# Patient Record
Sex: FEMALE | Race: WHITE | Employment: PART TIME | URBAN - METROPOLITAN AREA
[De-identification: names, ages, dates, MRNs, and addresses within clinical notes are randomized per-mention and may not be internally consistent; named-entity substitution may affect disease eponyms.]

---

## 2017-03-02 ENCOUNTER — HOSPITAL ENCOUNTER (OUTPATIENT)
Dept: CT IMAGING | Age: 42
Discharge: HOME | End: 2017-03-02
Attending: FAMILY MEDICINE
Payer: SELF-PAY

## 2017-03-02 DIAGNOSIS — R07.89 OTHER CHEST PAIN: ICD-10-CM

## 2017-03-02 PROCEDURE — 75571 CT HRT W/O DYE W/CA TEST: CPT

## 2017-03-02 NOTE — CARDIO/PULMONARY
Cardiac Rehab: Spoke with patient about Calcium Scoring results (0). Discussed significance of results, and CAD risk factors. Pt reported that she was being screened because of intermittent chest pain and family hx. Reported she has had chest discomfort \"for years\" and her PCP has not been able to find a cause. Pt was unsure if she had HLD and did not remember having cholesterol checked. Denied HTN, DM and smoking. Discussed heart healthy/low sodium diet and exercise. Has 4 children and does not exercise regularly. Explained cardiac stress test is generally recommended for pts with intermittent chest discomfort. Pt indicated she would seek out cardiologist in [place].  Copy of report sent to patient, with handouts on chest pain and stress test.

## 2022-10-21 ENCOUNTER — HOSPITAL ENCOUNTER (OUTPATIENT)
Dept: PREADMISSION TESTING | Age: 47
Discharge: HOME | End: 2022-10-21

## 2022-10-21 VITALS
HEIGHT: 66 IN | BODY MASS INDEX: 31.96 KG/M2 | OXYGEN SATURATION: 98 % | WEIGHT: 198.85 LBS | TEMPERATURE: 97.3 F | RESPIRATION RATE: 16 BRPM | HEART RATE: 88 BPM

## 2022-10-21 RX ORDER — SUMATRIPTAN 25 MG/1
25 TABLET, FILM COATED ORAL
COMMUNITY

## 2022-10-21 RX ORDER — FREMANEZUMAB-VFRM 225 MG/1.5ML
225 INJECTION SUBCUTANEOUS
COMMUNITY

## 2022-10-21 RX ORDER — DIPHENHYDRAMINE HCL 25 MG
25 TABLET ORAL AS NEEDED
COMMUNITY

## 2022-10-21 RX ORDER — AZELASTINE HCL 205.5 UG/1
1 SPRAY NASAL AS NEEDED
COMMUNITY

## 2022-10-21 RX ORDER — LEVOCETIRIZINE DIHYDROCHLORIDE 5 MG/1
5 TABLET, FILM COATED ORAL
COMMUNITY

## 2022-10-21 RX ORDER — METOCLOPRAMIDE 10 MG/1
10 TABLET ORAL AS NEEDED
COMMUNITY

## 2022-10-21 RX ORDER — SUMATRIPTAN 6 MG/.5ML
INJECTION, SOLUTION SUBCUTANEOUS AS NEEDED
COMMUNITY

## 2022-10-21 NOTE — PERIOP NOTES
[address] [address] [facility]   MAIN OR                                  [phone]   MAIN PRE OP                          [phone]                                                                                AMBULATORY PRE OP          [phone]  PRE-ADMISSION TESTING    [phone]   Surgery Date:  10/28/22       Is surgery arrival time given by surgeon? YES  NO  If NO, [facility] staff will call you between 3 and 7pm the day before your surgery with your arrival time. (If your surgery is on a Monday, we will call you the Friday before.)    Call [phone] after 7pm Monday-Friday if you did not receive this call. INSTRUCTIONS BEFORE YOUR SURGERY   When You  Arrive Arrive at the 2nd [facility] on the day of your surgery  Have your insurance card, photo ID, and any copayment (if needed)   Food   and   Drink NO food or drink after midnight the night before surgery    This means NO water, gum, mints, coffee, juice, etc.  No alcohol (beer, wine, liquor) 24 hours before and after surgery   Medications to   TAKE   Morning of Surgery MEDICATIONS TO TAKE THE MORNING OF SURGERY WITH A SIP OF WATER:      Medications  To  STOP      7 days before surgery Non-Steroidal anti-inflammatory Drugs (NSAID's): for example, Ibuprofen (Advil, Motrin), Naproxen (Aleve)  Aspirin, if taking for pain   Herbal supplements, vitamins, and fish oil  Other:  (Pain medications not listed above, including Tylenol may be taken)   Blood  Thinners If you take  Aspirin, Plavix, Coumadin, or any blood-thinning or anti-blood clot medicine, talk to the doctor who prescribed the medications for pre-operative instructions.    Bathing Clothing  Jewelry  Valuables     If you shower the morning of surgery, please do not apply anything to your skin (lotions, powders, deodorant, or makeup, especially mascara)  Follow Chlorhexidine Care Fusion body wash instructions provided to you during PAT appointment. Begin 3 days prior to surgery. Do not shave or trim anywhere 24 hours before surgery  Wear your hair loose or down; no pony-tails, buns, or metal hair clips  Wear loose, comfortable, clean clothes  Wear glasses instead of contacts  Leave money, valuables, and jewelry, including body piercings, at home  If you were given an [employer], bring it on day of surgery. Going Home - or Spending the Night SAME-DAY SURGERY: You must have a responsible adult drive you home and stay with you 24 hours after surgery  ADMITS: If your doctor is keeping you in the hospital after surgery, leave personal belongings/luggage in your car until you have a hospital room number. Hospital discharge time is 12 noon  Drivers must be here before 12 noon unless you are told differently   Special Instructions Please bring covid vaccine card day of surgery       Follow all instructions so your surgery wont be cancelled. Please, be on time. If a situation occurs and you are delayed the day of surgery, call [phone]. If your physical condition changes (like a fever, cold, flu, etc.) call your surgeon. Home medication(s) reviewed and verified via      LIST   VERBAL   during PAT appointment. The patient was contacted by    IN-PERSON  The patient verbalizes understanding of all instructions and     DOES NOT   need reinforcement.

## 2022-10-28 ENCOUNTER — ANESTHESIA (OUTPATIENT)
Dept: SURGERY | Age: 47
End: 2022-10-28
Payer: COMMERCIAL

## 2022-10-28 ENCOUNTER — HOSPITAL ENCOUNTER (OUTPATIENT)
Age: 47
Setting detail: OUTPATIENT SURGERY
Discharge: HOME | End: 2022-10-28
Attending: ORTHOPAEDIC SURGERY | Admitting: ORTHOPAEDIC SURGERY
Payer: COMMERCIAL

## 2022-10-28 ENCOUNTER — ANESTHESIA EVENT (OUTPATIENT)
Dept: SURGERY | Age: 47
End: 2022-10-28
Payer: COMMERCIAL

## 2022-10-28 VITALS
SYSTOLIC BLOOD PRESSURE: 106 MMHG | BODY MASS INDEX: 31.5 KG/M2 | RESPIRATION RATE: 27 BRPM | DIASTOLIC BLOOD PRESSURE: 53 MMHG | HEIGHT: 66 IN | WEIGHT: 195.99 LBS | HEART RATE: 84 BPM | OXYGEN SATURATION: 100 % | TEMPERATURE: 98.7 F

## 2022-10-28 LAB — HCG UR QL: NEGATIVE

## 2022-10-28 PROCEDURE — 77030026438 HC STYL ET INTUB CARD -A: Performed by: ANESTHESIOLOGY

## 2022-10-28 PROCEDURE — 74011250636 HC RX REV CODE- 250/636: Performed by: ORTHOPAEDIC SURGERY

## 2022-10-28 PROCEDURE — 77030018834: Performed by: ORTHOPAEDIC SURGERY

## 2022-10-28 PROCEDURE — 74011000250 HC RX REV CODE- 250: Performed by: ORTHOPAEDIC SURGERY

## 2022-10-28 PROCEDURE — 77030040922 HC BLNKT HYPOTHRM STRY -A

## 2022-10-28 PROCEDURE — 76060000061 HC AMB SURG ANES 0.5 TO 1 HR: Performed by: ORTHOPAEDIC SURGERY

## 2022-10-28 PROCEDURE — 76210000046 HC AMBSU PH II REC FIRST 0.5 HR: Performed by: ORTHOPAEDIC SURGERY

## 2022-10-28 PROCEDURE — 76210000040 HC AMBSU PH I REC FIRST 0.5 HR: Performed by: ORTHOPAEDIC SURGERY

## 2022-10-28 PROCEDURE — 77030008495 HC TBNG ARTHSC IRR CNMD -B: Performed by: ORTHOPAEDIC SURGERY

## 2022-10-28 PROCEDURE — 77030006884 HC BLD SHV INCIS S&N -B: Performed by: ORTHOPAEDIC SURGERY

## 2022-10-28 PROCEDURE — 77030000032 HC CUF TRNQT ZIMM -B: Performed by: ORTHOPAEDIC SURGERY

## 2022-10-28 PROCEDURE — 74011250636 HC RX REV CODE- 250/636: Performed by: ANESTHESIOLOGY

## 2022-10-28 PROCEDURE — 2709999900 HC NON-CHARGEABLE SUPPLY: Performed by: ORTHOPAEDIC SURGERY

## 2022-10-28 PROCEDURE — 74011250637 HC RX REV CODE- 250/637: Performed by: ANESTHESIOLOGY

## 2022-10-28 PROCEDURE — 77030008684 HC TU ET CUF COVD -B: Performed by: ANESTHESIOLOGY

## 2022-10-28 PROCEDURE — 77030040361 HC SLV COMPR DVT MDII -B

## 2022-10-28 PROCEDURE — 74011000250 HC RX REV CODE- 250: Performed by: ANESTHESIOLOGY

## 2022-10-28 PROCEDURE — 81025 URINE PREGNANCY TEST: CPT

## 2022-10-28 PROCEDURE — 76030000000 HC AMB SURG OR TIME 0.5 TO 1: Performed by: ORTHOPAEDIC SURGERY

## 2022-10-28 RX ORDER — SCOLOPAMINE TRANSDERMAL SYSTEM 1 MG/1
1 PATCH, EXTENDED RELEASE TRANSDERMAL
Status: DISCONTINUED | OUTPATIENT
Start: 2022-10-28 | End: 2022-10-28 | Stop reason: HOSPADM

## 2022-10-28 RX ORDER — FENTANYL CITRATE 50 UG/ML
INJECTION, SOLUTION INTRAMUSCULAR; INTRAVENOUS AS NEEDED
Status: DISCONTINUED | OUTPATIENT
Start: 2022-10-28 | End: 2022-10-28 | Stop reason: HOSPADM

## 2022-10-28 RX ORDER — SODIUM CHLORIDE, SODIUM LACTATE, POTASSIUM CHLORIDE, CALCIUM CHLORIDE 600; 310; 30; 20 MG/100ML; MG/100ML; MG/100ML; MG/100ML
125 INJECTION, SOLUTION INTRAVENOUS CONTINUOUS
Status: DISCONTINUED | OUTPATIENT
Start: 2022-10-28 | End: 2022-10-28 | Stop reason: HOSPADM

## 2022-10-28 RX ORDER — PROPOFOL 10 MG/ML
INJECTION, EMULSION INTRAVENOUS AS NEEDED
Status: DISCONTINUED | OUTPATIENT
Start: 2022-10-28 | End: 2022-10-28 | Stop reason: HOSPADM

## 2022-10-28 RX ORDER — NEOSTIGMINE METHYLSULFATE 1 MG/ML
INJECTION, SOLUTION INTRAVENOUS AS NEEDED
Status: DISCONTINUED | OUTPATIENT
Start: 2022-10-28 | End: 2022-10-28 | Stop reason: HOSPADM

## 2022-10-28 RX ORDER — LIDOCAINE HYDROCHLORIDE 20 MG/ML
INJECTION, SOLUTION EPIDURAL; INFILTRATION; INTRACAUDAL; PERINEURAL AS NEEDED
Status: DISCONTINUED | OUTPATIENT
Start: 2022-10-28 | End: 2022-10-28 | Stop reason: HOSPADM

## 2022-10-28 RX ORDER — ROCURONIUM BROMIDE 10 MG/ML
INJECTION, SOLUTION INTRAVENOUS AS NEEDED
Status: DISCONTINUED | OUTPATIENT
Start: 2022-10-28 | End: 2022-10-28 | Stop reason: HOSPADM

## 2022-10-28 RX ORDER — ONDANSETRON 2 MG/ML
INJECTION INTRAMUSCULAR; INTRAVENOUS AS NEEDED
Status: DISCONTINUED | OUTPATIENT
Start: 2022-10-28 | End: 2022-10-28 | Stop reason: HOSPADM

## 2022-10-28 RX ORDER — PROPOFOL 10 MG/ML
INJECTION, EMULSION INTRAVENOUS
Status: DISCONTINUED | OUTPATIENT
Start: 2022-10-28 | End: 2022-10-28 | Stop reason: HOSPADM

## 2022-10-28 RX ORDER — FLUMAZENIL 0.1 MG/ML
0.2 INJECTION INTRAVENOUS
Status: DISCONTINUED | OUTPATIENT
Start: 2022-10-28 | End: 2022-10-28 | Stop reason: HOSPADM

## 2022-10-28 RX ORDER — BUPIVACAINE HYDROCHLORIDE AND EPINEPHRINE 5; 5 MG/ML; UG/ML
INJECTION, SOLUTION EPIDURAL; INTRACAUDAL; PERINEURAL AS NEEDED
Status: DISCONTINUED | OUTPATIENT
Start: 2022-10-28 | End: 2022-10-28 | Stop reason: HOSPADM

## 2022-10-28 RX ORDER — GLYCOPYRROLATE 0.2 MG/ML
INJECTION INTRAMUSCULAR; INTRAVENOUS AS NEEDED
Status: DISCONTINUED | OUTPATIENT
Start: 2022-10-28 | End: 2022-10-28 | Stop reason: HOSPADM

## 2022-10-28 RX ORDER — DIPHENHYDRAMINE HYDROCHLORIDE 50 MG/ML
12.5 INJECTION, SOLUTION INTRAMUSCULAR; INTRAVENOUS AS NEEDED
Status: DISCONTINUED | OUTPATIENT
Start: 2022-10-28 | End: 2022-10-28 | Stop reason: HOSPADM

## 2022-10-28 RX ORDER — HYDROMORPHONE HYDROCHLORIDE 1 MG/ML
.25-1 INJECTION, SOLUTION INTRAMUSCULAR; INTRAVENOUS; SUBCUTANEOUS
Status: DISCONTINUED | OUTPATIENT
Start: 2022-10-28 | End: 2022-10-28 | Stop reason: HOSPADM

## 2022-10-28 RX ORDER — KETOROLAC TROMETHAMINE 15 MG/ML
INJECTION, SOLUTION INTRAMUSCULAR; INTRAVENOUS AS NEEDED
Status: DISCONTINUED | OUTPATIENT
Start: 2022-10-28 | End: 2022-10-28 | Stop reason: HOSPADM

## 2022-10-28 RX ORDER — LIDOCAINE HYDROCHLORIDE 10 MG/ML
0.1 INJECTION, SOLUTION EPIDURAL; INFILTRATION; INTRACAUDAL; PERINEURAL AS NEEDED
Status: DISCONTINUED | OUTPATIENT
Start: 2022-10-28 | End: 2022-10-28 | Stop reason: HOSPADM

## 2022-10-28 RX ORDER — MIDAZOLAM HYDROCHLORIDE 1 MG/ML
INJECTION, SOLUTION INTRAMUSCULAR; INTRAVENOUS AS NEEDED
Status: DISCONTINUED | OUTPATIENT
Start: 2022-10-28 | End: 2022-10-28 | Stop reason: HOSPADM

## 2022-10-28 RX ORDER — NALOXONE HYDROCHLORIDE 0.4 MG/ML
0.2 INJECTION, SOLUTION INTRAMUSCULAR; INTRAVENOUS; SUBCUTANEOUS
Status: DISCONTINUED | OUTPATIENT
Start: 2022-10-28 | End: 2022-10-28 | Stop reason: HOSPADM

## 2022-10-28 RX ADMIN — PROPOFOL 150 MG: 10 INJECTION, EMULSION INTRAVENOUS at 11:10

## 2022-10-28 RX ADMIN — ONDANSETRON HYDROCHLORIDE 4 MG: 2 SOLUTION INTRAMUSCULAR; INTRAVENOUS at 11:37

## 2022-10-28 RX ADMIN — PROPOFOL 125 MCG/KG/MIN: 10 INJECTION, EMULSION INTRAVENOUS at 11:15

## 2022-10-28 RX ADMIN — LIDOCAINE HYDROCHLORIDE 100 MG: 20 INJECTION, SOLUTION INTRAVENOUS at 11:10

## 2022-10-28 RX ADMIN — MIDAZOLAM HYDROCHLORIDE 2 MG: 1 INJECTION, SOLUTION INTRAMUSCULAR; INTRAVENOUS at 11:07

## 2022-10-28 RX ADMIN — FENTANYL CITRATE 150 MCG: 50 INJECTION, SOLUTION INTRAMUSCULAR; INTRAVENOUS at 11:10

## 2022-10-28 RX ADMIN — NEOSTIGMINE METHYLSULFATE 3 MG: 1 INJECTION, SOLUTION INTRAVENOUS at 11:38

## 2022-10-28 RX ADMIN — PROPOFOL 50 MG: 10 INJECTION, EMULSION INTRAVENOUS at 11:20

## 2022-10-28 RX ADMIN — ROCURONIUM BROMIDE 35 MG: 10 INJECTION INTRAVENOUS at 11:10

## 2022-10-28 RX ADMIN — CEFAZOLIN SODIUM 2 G: 1 POWDER, FOR SOLUTION INTRAMUSCULAR; INTRAVENOUS at 11:18

## 2022-10-28 RX ADMIN — FENTANYL CITRATE 100 MCG: 50 INJECTION, SOLUTION INTRAMUSCULAR; INTRAVENOUS at 11:35

## 2022-10-28 RX ADMIN — KETOROLAC TROMETHAMINE 30 MG: 15 INJECTION, SOLUTION INTRAMUSCULAR; INTRAVENOUS at 11:38

## 2022-10-28 RX ADMIN — SODIUM CHLORIDE, POTASSIUM CHLORIDE, SODIUM LACTATE AND CALCIUM CHLORIDE 125 ML/HR: 600; 310; 30; 20 INJECTION, SOLUTION INTRAVENOUS at 08:48

## 2022-10-28 RX ADMIN — GLYCOPYRROLATE 0.4 MG: 0.2 INJECTION INTRAMUSCULAR; INTRAVENOUS at 11:38

## 2022-10-28 NOTE — OP NOTES
Anival Com[facility]  OPERATIVE REPORT    Name:  Chinmay Fragoso  MR#:  [id number]  :  1975  ACCOUNT #:  [de-identified]  DATE OF SERVICE:  10/28/2022    PREOPERATIVE DIAGNOSIS:  Right knee chondromalacia. POSTOPERATIVE DIAGNOSIS:  Right knee chondromalacia. PROCEDURE PERFORMED:  Right knee arthroscopic chondroplasty of patella, medial and lateral femoral condyles. SURGEON:  nAtonette Vaughn MD.    ASSISTANT:  Stefan Peters    ANESTHESIA:  General.    COMPLICATIONS:  None. SPECIMENS REMOVED:  None. IMPLANTS:  None. ESTIMATED BLOOD LOSS:  Minimal.    DRAINS:  None. PROCEDURE:  The patient was brought to the operating room and given general anesthesia in a supine position on the OR table. A tourniquet and soft roll were applied to the right thigh. The extremity was exsanguinated with an Esmarch and the tourniquet was inflated. The leg was placed into a leg hartman. The contralateral extremity was appropriately positioned, protected, and secured. The right leg was prepped and draped in a sterile fashion with ChloraPrep. A time out was performed. Preoperative prophylactic antibiotic infusion was confirmed. Standard anteromedial and anterolateral portals were created and the knee was examined systematically. Patellofemoral compartment had some diffuse grade III chondromalacia along the lateral facet and median ridge. There were no loose bodies in the suprapatellar pouch, medial or lateral gutters. Medial compartment with some diffuse grade III chondromalacia of the weightbearing surface of the medial femoral condyle with unstable articular cartilage. Lateral compartment had similar findings. The ACL and PCL were intact. The menisci were intact. Chondroplasty performed in all three compartments to remove the unstable articular cartilage. The remaining cartilage was stable to probing. The joint was thoroughly irrigated. The portals closed with nylon.   Knee injected with 30 mL of 0.5% Marcaine. Sterile dressing applied. The patient awakened and sent to recovery in stable condition.       Latisha Denson MD      [id number]  D:  10/28/2022 11:43  T:  10/28/2022 18:04  JOB #:  [id number]

## 2022-10-28 NOTE — BRIEF OP NOTE
Brief Postoperative Note    Patient: Juanito Haines  YOB: 1975  MRN: [id number]    Date of Procedure: 10/28/2022     Pre-Op Diagnosis: Chondromalacia of right patella [M22.41]  Chondromalacia of right knee [M94.261]    Post-Op Diagnosis: Same as preoperative diagnosis.       Procedure(s):  RIGHT KNEE ARTHROSCOPIC CHONDROPLASTY    Surgeon(s):  Ryan Hudson MD    Surgical Assistant: Surg Asst-1: Stefan Peters    Anesthesia: General     Estimated Blood Loss (mL): Minimal    Complications: None    Specimens: * No specimens in log *     Implants: * No implants in log *    Drains: * No LDAs found *    Findings: Chondromalacia     Electronically Signed by Nimo Lombardi MD on 10/28/2022 at 11:39 AM

## 2022-10-28 NOTE — ANESTHESIA PREPROCEDURE EVALUATION
Relevant Problems   No relevant active problems       Anesthetic History     PONV          Review of Systems / Medical History  Patient summary reviewed and pertinent labs reviewed    Pulmonary  Within defined limits                 Neuro/Psych   Within defined limits           Cardiovascular                  Exercise tolerance: >4 METS     GI/Hepatic/Renal  Within defined limits              Endo/Other        Arthritis     Other Findings              Physical Exam    Airway  Mallampati: II  TM Distance: 4 - 6 cm  Neck ROM: normal range of motion   Mouth opening: Normal     Cardiovascular    Rhythm: regular  Rate: normal         Dental  No notable dental hx       Pulmonary  Breath sounds clear to auscultation               Abdominal  GI exam deferred       Other Findings            Anesthetic Plan    ASA: 2  Anesthesia type: general          Induction: Intravenous  Anesthetic plan and risks discussed with: Patient

## 2022-11-14 NOTE — ANESTHESIA POSTPROCEDURE EVALUATION
Procedure(s):  RIGHT KNEE ARTHROSCOPIC CHONDROPLASTY.     general    Anesthesia Post Evaluation      Multimodal analgesia: multimodal analgesia not used between 6 hours prior to anesthesia start to PACU discharge  Patient location during evaluation: PACU  Patient participation: complete - patient participated  Level of consciousness: awake and alert  Pain score: 2  Pain management: satisfactory to patient  Airway patency: patent  Anesthetic complications: no  Cardiovascular status: acceptable  Respiratory status: acceptable  Hydration status: acceptable  Post anesthesia nausea and vomiting:  none      INITIAL Post-op Vital signs:   Vitals Value Taken Time   /86 10/28/22 1215   Temp     Pulse 78 10/28/22 1215   Resp 15 10/28/22 1215   SpO2 95 % 10/28/22 1215

## 2023-05-20 RX ORDER — FREMANEZUMAB-VFRM 225 MG/1.5ML
225 INJECTION SUBCUTANEOUS
COMMUNITY

## 2023-05-20 RX ORDER — AZELASTINE HCL 205.5 UG/1
1 SPRAY NASAL PRN
COMMUNITY

## 2023-05-20 RX ORDER — SUMATRIPTAN 6 MG/.5ML
INJECTION, SOLUTION SUBCUTANEOUS PRN
COMMUNITY

## 2023-05-20 RX ORDER — SUMATRIPTAN 25 MG/1
25 TABLET, FILM COATED ORAL DAILY PRN
COMMUNITY

## 2023-05-20 RX ORDER — LEVOCETIRIZINE DIHYDROCHLORIDE 5 MG/1
5 TABLET, FILM COATED ORAL
COMMUNITY

## 2023-05-20 RX ORDER — METOCLOPRAMIDE 10 MG/1
10 TABLET ORAL PRN
COMMUNITY

## 2023-11-04 ENCOUNTER — HOSPITAL ENCOUNTER (EMERGENCY)
Facility: HOSPITAL | Age: 48
Discharge: HOME | End: 2023-11-04
Attending: EMERGENCY MEDICINE
Payer: COMMERCIAL

## 2023-11-04 ENCOUNTER — APPOINTMENT (OUTPATIENT)
Facility: HOSPITAL | Age: 48
End: 2023-11-04
Payer: COMMERCIAL

## 2023-11-04 VITALS
HEART RATE: 81 BPM | DIASTOLIC BLOOD PRESSURE: 88 MMHG | RESPIRATION RATE: 18 BRPM | BODY MASS INDEX: 30.53 KG/M2 | HEIGHT: 66 IN | SYSTOLIC BLOOD PRESSURE: 143 MMHG | OXYGEN SATURATION: 100 % | WEIGHT: 190 LBS | TEMPERATURE: 98.2 F

## 2023-11-04 DIAGNOSIS — G43.901 STATUS MIGRAINOSUS: Primary | ICD-10-CM

## 2023-11-04 LAB
ALBUMIN SERPL-MCNC: 3.5 G/DL (ref 3.5–5)
ALBUMIN/GLOB SERPL: 0.9 (ref 1.1–2.2)
ALP SERPL-CCNC: 112 U/L (ref 45–117)
ALT SERPL-CCNC: 27 U/L (ref 12–78)
ANION GAP SERPL CALC-SCNC: 7 MMOL/L (ref 5–15)
APPEARANCE UR: CLEAR
AST SERPL W P-5'-P-CCNC: ABNORMAL U/L (ref 15–37)
BACTERIA URNS QL MICRO: ABNORMAL /HPF
BASOPHILS # BLD: 0.1 K/UL (ref 0–0.1)
BASOPHILS NFR BLD: 1 % (ref 0–1)
BILIRUB SERPL-MCNC: 0.5 MG/DL (ref 0.2–1)
BILIRUB UR QL: NEGATIVE
BUN SERPL-MCNC: 11 MG/DL (ref 6–20)
BUN/CREAT SERPL: 15 (ref 12–20)
CA-I BLD-MCNC: 8.6 MG/DL (ref 8.5–10.1)
CHLORIDE SERPL-SCNC: 109 MMOL/L (ref 97–108)
CO2 SERPL-SCNC: 24 MMOL/L (ref 21–32)
COLOR UR: ABNORMAL
CREAT SERPL-MCNC: 0.72 MG/DL (ref 0.55–1.02)
DIFFERENTIAL METHOD BLD: ABNORMAL
EOSINOPHIL # BLD: 0.2 K/UL (ref 0–0.4)
EOSINOPHIL NFR BLD: 2 % (ref 0–7)
EPITH CASTS URNS QL MICRO: ABNORMAL /LPF
ERYTHROCYTE [DISTWIDTH] IN BLOOD BY AUTOMATED COUNT: 13.6 % (ref 11.5–14.5)
GLOBULIN SER CALC-MCNC: 3.9 G/DL (ref 2–4)
GLUCOSE SERPL-MCNC: 90 MG/DL (ref 65–100)
GLUCOSE UR STRIP.AUTO-MCNC: NEGATIVE MG/DL
HCT VFR BLD AUTO: 40.3 % (ref 35–47)
HGB BLD-MCNC: 13.3 G/DL (ref 11.5–16)
HGB UR QL STRIP: NEGATIVE
IMM GRANULOCYTES # BLD AUTO: 0.1 K/UL (ref 0–0.04)
IMM GRANULOCYTES NFR BLD AUTO: 1 % (ref 0–0.5)
KETONES UR QL STRIP.AUTO: NEGATIVE MG/DL
LEUKOCYTE ESTERASE UR QL STRIP.AUTO: ABNORMAL
LYMPHOCYTES # BLD: 2.2 K/UL (ref 0.8–3.5)
LYMPHOCYTES NFR BLD: 20 % (ref 12–49)
MCH RBC QN AUTO: 27.4 PG (ref 26–34)
MCHC RBC AUTO-ENTMCNC: 33 G/DL (ref 30–36.5)
MCV RBC AUTO: 82.9 FL (ref 80–99)
MONOCYTES # BLD: 0.6 K/UL (ref 0–1)
MONOCYTES NFR BLD: 6 % (ref 5–13)
NEUTS SEG # BLD: 7.9 K/UL (ref 1.8–8)
NEUTS SEG NFR BLD: 70 % (ref 32–75)
NITRITE UR QL STRIP.AUTO: NEGATIVE
NRBC # BLD: 0 K/UL (ref 0–0.01)
NRBC BLD-RTO: 0 PER 100 WBC
PH UR STRIP: 7 (ref 5–8)
PLATELET # BLD AUTO: 314 K/UL (ref 150–400)
PMV BLD AUTO: 10.3 FL (ref 8.9–12.9)
POTASSIUM SERPL-SCNC: ABNORMAL MMOL/L (ref 3.5–5.1)
PROT SERPL-MCNC: 7.4 G/DL (ref 6.4–8.2)
PROT UR STRIP-MCNC: NEGATIVE MG/DL
RBC # BLD AUTO: 4.86 M/UL (ref 3.8–5.2)
RBC #/AREA URNS HPF: ABNORMAL /HPF (ref 0–5)
SODIUM SERPL-SCNC: 140 MMOL/L (ref 136–145)
SP GR UR REFRACTOMETRY: 1.01 (ref 1–1.03)
URINE CULTURE IF INDICATED: ABNORMAL
UROBILINOGEN UR QL STRIP.AUTO: 0.1 EU/DL (ref 0.1–1)
WBC # BLD AUTO: 11.1 K/UL (ref 3.6–11)
WBC URNS QL MICRO: ABNORMAL /HPF (ref 0–4)

## 2023-11-04 PROCEDURE — 96366 THER/PROPH/DIAG IV INF ADDON: CPT

## 2023-11-04 PROCEDURE — 80053 COMPREHEN METABOLIC PANEL: CPT

## 2023-11-04 PROCEDURE — 96365 THER/PROPH/DIAG IV INF INIT: CPT

## 2023-11-04 PROCEDURE — 6370000000 HC RX 637 (ALT 250 FOR IP): Performed by: EMERGENCY MEDICINE

## 2023-11-04 PROCEDURE — 2500000003 HC RX 250 WO HCPCS: Performed by: EMERGENCY MEDICINE

## 2023-11-04 PROCEDURE — 96376 TX/PRO/DX INJ SAME DRUG ADON: CPT

## 2023-11-04 PROCEDURE — 36415 COLL VENOUS BLD VENIPUNCTURE: CPT

## 2023-11-04 PROCEDURE — 81001 URINALYSIS AUTO W/SCOPE: CPT

## 2023-11-04 PROCEDURE — 6360000002 HC RX W HCPCS: Performed by: EMERGENCY MEDICINE

## 2023-11-04 PROCEDURE — 70450 CT HEAD/BRAIN W/O DYE: CPT

## 2023-11-04 PROCEDURE — 99284 EMERGENCY DEPT VISIT MOD MDM: CPT

## 2023-11-04 PROCEDURE — 2580000003 HC RX 258: Performed by: EMERGENCY MEDICINE

## 2023-11-04 PROCEDURE — 85025 COMPLETE CBC W/AUTO DIFF WBC: CPT

## 2023-11-04 PROCEDURE — 96375 TX/PRO/DX INJ NEW DRUG ADDON: CPT

## 2023-11-04 RX ORDER — KETOROLAC TROMETHAMINE 15 MG/ML
15 INJECTION, SOLUTION INTRAMUSCULAR; INTRAVENOUS ONCE
Status: COMPLETED | OUTPATIENT
Start: 2023-11-04 | End: 2023-11-04

## 2023-11-04 RX ORDER — 0.9 % SODIUM CHLORIDE 0.9 %
1000 INTRAVENOUS SOLUTION INTRAVENOUS ONCE
Status: COMPLETED | OUTPATIENT
Start: 2023-11-04 | End: 2023-11-04

## 2023-11-04 RX ORDER — DEXAMETHASONE SODIUM PHOSPHATE 10 MG/ML
10 INJECTION, SOLUTION INTRAMUSCULAR; INTRAVENOUS
Status: COMPLETED | OUTPATIENT
Start: 2023-11-04 | End: 2023-11-04

## 2023-11-04 RX ORDER — ONDANSETRON 4 MG/1
4 TABLET, ORALLY DISINTEGRATING ORAL EVERY 8 HOURS PRN
Status: DISCONTINUED | OUTPATIENT
Start: 2023-11-04 | End: 2023-11-04

## 2023-11-04 RX ORDER — ONDANSETRON 2 MG/ML
4 INJECTION INTRAMUSCULAR; INTRAVENOUS EVERY 6 HOURS PRN
Status: DISCONTINUED | OUTPATIENT
Start: 2023-11-04 | End: 2023-11-04 | Stop reason: HOSPADM

## 2023-11-04 RX ORDER — MAGNESIUM SULFATE HEPTAHYDRATE 40 MG/ML
2000 INJECTION, SOLUTION INTRAVENOUS ONCE
Status: COMPLETED | OUTPATIENT
Start: 2023-11-04 | End: 2023-11-04

## 2023-11-04 RX ORDER — DIPHENHYDRAMINE HYDROCHLORIDE 50 MG/ML
25 INJECTION INTRAMUSCULAR; INTRAVENOUS
Status: COMPLETED | OUTPATIENT
Start: 2023-11-04 | End: 2023-11-04

## 2023-11-04 RX ORDER — PROCHLORPERAZINE EDISYLATE 5 MG/ML
10 INJECTION INTRAMUSCULAR; INTRAVENOUS
Status: COMPLETED | OUTPATIENT
Start: 2023-11-04 | End: 2023-11-04

## 2023-11-04 RX ORDER — DIAZEPAM 5 MG/1
5 TABLET ORAL ONCE
Status: COMPLETED | OUTPATIENT
Start: 2023-11-04 | End: 2023-11-04

## 2023-11-04 RX ORDER — ONDANSETRON 4 MG/1
4 TABLET, ORALLY DISINTEGRATING ORAL ONCE
Status: COMPLETED | OUTPATIENT
Start: 2023-11-04 | End: 2023-11-04

## 2023-11-04 RX ORDER — KETOROLAC TROMETHAMINE 30 MG/ML
30 INJECTION, SOLUTION INTRAMUSCULAR; INTRAVENOUS
Status: COMPLETED | OUTPATIENT
Start: 2023-11-04 | End: 2023-11-04

## 2023-11-04 RX ORDER — KETOROLAC TROMETHAMINE 10 MG/1
10 TABLET, FILM COATED ORAL EVERY 6 HOURS PRN
Qty: 12 TABLET | Refills: 0 | Status: SHIPPED | OUTPATIENT
Start: 2023-11-04 | End: 2023-11-07

## 2023-11-04 RX ADMIN — SODIUM CHLORIDE 1000 ML: 9 INJECTION, SOLUTION INTRAVENOUS at 11:17

## 2023-11-04 RX ADMIN — KETOROLAC TROMETHAMINE 30 MG: 30 INJECTION, SOLUTION INTRAMUSCULAR; INTRAVENOUS at 19:43

## 2023-11-04 RX ADMIN — DEXAMETHASONE SODIUM PHOSPHATE 10 MG: 10 INJECTION, SOLUTION INTRAMUSCULAR; INTRAVENOUS at 15:43

## 2023-11-04 RX ADMIN — KETOROLAC TROMETHAMINE 15 MG: 15 INJECTION, SOLUTION INTRAMUSCULAR; INTRAVENOUS at 11:17

## 2023-11-04 RX ADMIN — ONDANSETRON 4 MG: 4 TABLET, ORALLY DISINTEGRATING ORAL at 18:46

## 2023-11-04 RX ADMIN — ONDANSETRON 4 MG: 2 INJECTION INTRAMUSCULAR; INTRAVENOUS at 17:53

## 2023-11-04 RX ADMIN — PROCHLORPERAZINE EDISYLATE 10 MG: 5 INJECTION INTRAMUSCULAR; INTRAVENOUS at 11:17

## 2023-11-04 RX ADMIN — DIAZEPAM 5 MG: 5 TABLET ORAL at 17:50

## 2023-11-04 RX ADMIN — DIPHENHYDRAMINE HYDROCHLORIDE 25 MG: 50 INJECTION INTRAMUSCULAR; INTRAVENOUS at 11:16

## 2023-11-04 RX ADMIN — MAGNESIUM SULFATE HEPTAHYDRATE 2000 MG: 40 INJECTION, SOLUTION INTRAVENOUS at 15:47

## 2023-11-04 RX ADMIN — SODIUM CHLORIDE 1000 ML: 9 INJECTION, SOLUTION INTRAVENOUS at 15:43

## 2023-11-04 ASSESSMENT — PAIN DESCRIPTION - LOCATION: LOCATION: HEAD

## 2023-11-04 ASSESSMENT — LIFESTYLE VARIABLES
HOW OFTEN DO YOU HAVE A DRINK CONTAINING ALCOHOL: MONTHLY OR LESS
HOW MANY STANDARD DRINKS CONTAINING ALCOHOL DO YOU HAVE ON A TYPICAL DAY: 1 OR 2

## 2023-11-04 ASSESSMENT — PAIN - FUNCTIONAL ASSESSMENT: PAIN_FUNCTIONAL_ASSESSMENT: 0-10

## 2023-11-04 ASSESSMENT — PAIN SCALES - GENERAL: PAINLEVEL_OUTOF10: 10

## 2023-11-04 NOTE — DISCHARGE INSTRUCTIONS
Thank you! Thank you for allowing me to care for you in the emergency department. It is my goal to provide you with excellent care. If you have not received excellent quality care, please ask to speak to the nurse manager. Please fill out the survey that will come to you by mail or email since we listen to your feedback! Below you will find a list of your tests from today's visit. Should you have any questions, please do not hesitate to call the emergency department.     Labs  Recent Results (from the past 12 hour(s))   CBC with Auto Differential    Collection Time: 11/04/23 11:18 AM   Result Value Ref Range    WBC 11.1 (H) 3.6 - 11.0 K/uL    RBC 4.86 3.80 - 5.20 M/uL    Hemoglobin 13.3 11.5 - 16.0 g/dL    Hematocrit 40.3 35.0 - 47.0 %    MCV 82.9 80.0 - 99.0 FL    MCH 27.4 26.0 - 34.0 PG    MCHC 33.0 30.0 - 36.5 g/dL    RDW 13.6 11.5 - 14.5 %    Platelets 242 478 - 486 K/uL    MPV 10.3 8.9 - 12.9 FL    Nucleated RBCs 0.0 0.0  WBC    nRBC 0.00 0.00 - 0.01 K/uL    Neutrophils % 70 32 - 75 %    Lymphocytes % 20 12 - 49 %    Monocytes % 6 5 - 13 %    Eosinophils % 2 0 - 7 %    Basophils % 1 0 - 1 %    Immature Granulocytes 1 (H) 0 - 0.5 %    Neutrophils Absolute 7.9 1.8 - 8.0 K/UL    Lymphocytes Absolute 2.2 0.8 - 3.5 K/UL    Monocytes Absolute 0.6 0.0 - 1.0 K/UL    Eosinophils Absolute 0.2 0.0 - 0.4 K/UL    Basophils Absolute 0.1 0.0 - 0.1 K/UL    Absolute Immature Granulocyte 0.1 (H) 0.00 - 0.04 K/UL    Differential Type AUTOMATED     CMP    Collection Time: 11/04/23 11:18 AM   Result Value Ref Range    Sodium 140 136 - 145 mmol/L    Potassium Hemolyzed, Recollection Recommended 3.5 - 5.1 mmol/L    Chloride 109 (H) 97 - 108 mmol/L    CO2 24 21 - 32 mmol/L    Anion Gap 7 5 - 15 mmol/L    Glucose 90 65 - 100 mg/dL    BUN 11 6 - 20 mg/dL    Creatinine 0.72 0.55 - 1.02 mg/dL    Bun/Cre Ratio 15 12 - 20      Est, Glom Filt Rate >60 >60 ml/min/1.73m2    Calcium 8.6 8.5 - 10.1 mg/dL    Total Bilirubin 0.5 care provider, please return to the Emergency Department. We are available 24 hours a day. If a prescription has been provided, please have it filled as soon as possible to prevent a delay in treatment. If you have any questions or reservations about taking the medication due to side effects or interactions with other medications, please call your primary care provider or contact the ER.

## 2023-11-04 NOTE — ED TRIAGE NOTES
Pt arrives with complaints of migraines and nausea that started a week ago. Pt has a hx of migraines and takes sumatripan (imitrex) and states that hasn't been working.

## 2023-11-04 NOTE — ED PROVIDER NOTES
instructions have been discussed, understanding conveyed, and agreed upon. The patient is to follow up as recommended or return to ER should their symptoms worsen. PATIENT REFERRED TO:  Reha King, [address] [facility]  [address] [place] [address]  [phone]    Schedule an appointment as soon as possible for a visit       [facility] EMERGENCY DEPT  [address] [place] [address]  [phone]    As needed        DISCHARGE MEDICATIONS:     Medication List        START taking these medications      ketorolac 10 MG tablet  Commonly known as: TORADOL  Take 1 tablet by mouth every 6 hours as needed for Pain            ASK your doctor about these medications      Ajovy 225 MG/1.5ML Sosy  Generic drug: Fremanezumab-vfrm     azelastine HCl 0.15 % Soln     diphenhydrAMINE 25 MG tablet  Commonly known as: SOMINEX     levocetirizine 5 MG tablet  Commonly known as: XYZAL     MAGNESIUM PO     metoclopramide 10 MG tablet  Commonly known as: REGLAN     POTASSIUM PO     * SUMAtriptan 25 MG tablet  Commonly known as: IMITREX     * SUMAtriptan Succinate 6 MG/0.5ML Sosy injection           * This list has 2 medication(s) that are the same as other medications prescribed for you. Read the directions carefully, and ask your doctor or other care provider to review them with you. Where to Get Your Medications        These medications were sent to [facility], [address] [place] [address]  [address] [place] [address], [address] [place] [address]      Phone: [phone]   ketorolac 10 MG tablet           DISCONTINUED MEDICATIONS:  Discharge Medication List as of 11/4/2023  8:50 PM          I am the Primary Clinician of Record. Gavino Ware MD (electronically signed)    (Please note that parts of this dictation were completed with voice recognition software.  Quite often unanticipated grammatical, syntax, homophones, and other interpretive errors are inadvertently transcribed by the computer software. Please disregards these errors.  Please excuse any errors that have escaped final proofreading.)        Severino Pearl MD  11/05/23 8969

## 2023-12-05 ENCOUNTER — OFFICE VISIT (OUTPATIENT)
Age: 48
End: 2023-12-05
Payer: COMMERCIAL

## 2023-12-05 VITALS
OXYGEN SATURATION: 97 % | BODY MASS INDEX: 31.18 KG/M2 | DIASTOLIC BLOOD PRESSURE: 84 MMHG | HEART RATE: 87 BPM | RESPIRATION RATE: 22 BRPM | HEIGHT: 66 IN | WEIGHT: 194 LBS | SYSTOLIC BLOOD PRESSURE: 132 MMHG | TEMPERATURE: 98.5 F

## 2023-12-05 DIAGNOSIS — M25.561 CHRONIC PAIN OF BOTH KNEES: ICD-10-CM

## 2023-12-05 DIAGNOSIS — M25.562 CHRONIC PAIN OF BOTH KNEES: ICD-10-CM

## 2023-12-05 DIAGNOSIS — Z76.89 ENCOUNTER TO ESTABLISH CARE WITH NEW DOCTOR: Primary | ICD-10-CM

## 2023-12-05 DIAGNOSIS — G89.29 CHRONIC PAIN OF BOTH KNEES: ICD-10-CM

## 2023-12-05 DIAGNOSIS — J32.9 FREQUENT SINUS INFECTIONS: ICD-10-CM

## 2023-12-05 PROCEDURE — 99203 OFFICE O/P NEW LOW 30 MIN: CPT

## 2023-12-05 SDOH — ECONOMIC STABILITY: FOOD INSECURITY: WITHIN THE PAST 12 MONTHS, THE FOOD YOU BOUGHT JUST DIDN'T LAST AND YOU DIDN'T HAVE MONEY TO GET MORE.: PATIENT DECLINED

## 2023-12-05 SDOH — ECONOMIC STABILITY: FOOD INSECURITY: WITHIN THE PAST 12 MONTHS, YOU WORRIED THAT YOUR FOOD WOULD RUN OUT BEFORE YOU GOT MONEY TO BUY MORE.: PATIENT DECLINED

## 2023-12-05 SDOH — ECONOMIC STABILITY: INCOME INSECURITY: HOW HARD IS IT FOR YOU TO PAY FOR THE VERY BASICS LIKE FOOD, HOUSING, MEDICAL CARE, AND HEATING?: PATIENT DECLINED

## 2023-12-05 SDOH — ECONOMIC STABILITY: HOUSING INSECURITY
IN THE LAST 12 MONTHS, WAS THERE A TIME WHEN YOU DID NOT HAVE A STEADY PLACE TO SLEEP OR SLEPT IN A SHELTER (INCLUDING NOW)?: PATIENT REFUSED

## 2023-12-05 ASSESSMENT — PATIENT HEALTH QUESTIONNAIRE - PHQ9
1. LITTLE INTEREST OR PLEASURE IN DOING THINGS: 1
SUM OF ALL RESPONSES TO PHQ QUESTIONS 1-9: 2
SUM OF ALL RESPONSES TO PHQ QUESTIONS 1-9: 2
SUM OF ALL RESPONSES TO PHQ9 QUESTIONS 1 & 2: 2
SUM OF ALL RESPONSES TO PHQ QUESTIONS 1-9: 2
2. FEELING DOWN, DEPRESSED OR HOPELESS: 1
SUM OF ALL RESPONSES TO PHQ QUESTIONS 1-9: 2

## 2023-12-05 NOTE — PATIENT INSTRUCTIONS
- please fax your most recent prior lab results as able for us to review  - follow up in x6 months for annual physical and lab work as indicated  - please reach out with any questions or concerns in the interim    Rheumatology Referral Options    [facility], [address], [address] [facility] [address]   [address] [place] [address], [address] [facility]  [phone]  *Website states accepts [facility]  [address], [address]  [phone]  *Accepts MARISA YEUNG [facility]   [address], [address] [facility], [address]   [phone]  *Does NOT accept Medicaid, Dual Programs    [facility]  [address], [address]  Phone: [phone]  *Accepts Medicaid

## 2024-02-14 ENCOUNTER — TELEMEDICINE (OUTPATIENT)
Age: 49
End: 2024-02-14
Payer: COMMERCIAL

## 2024-02-14 DIAGNOSIS — J32.9 FREQUENT SINUS INFECTIONS: Primary | ICD-10-CM

## 2024-02-14 PROBLEM — G43.909 MIGRAINES: Status: ACTIVE | Noted: 2024-02-14

## 2024-02-14 PROCEDURE — 99213 OFFICE O/P EST LOW 20 MIN: CPT | Performed by: FAMILY MEDICINE

## 2024-02-14 RX ORDER — LEVOFLOXACIN 500 MG/1
500 TABLET, FILM COATED ORAL DAILY
Qty: 10 TABLET | Refills: 0 | Status: SHIPPED | OUTPATIENT
Start: 2024-02-14 | End: 2024-02-24

## 2024-02-14 NOTE — PROGRESS NOTES
Hilda Oh  48 y.o. female  1975  [address] [facility] [address]  [id number]   [facility]:    Telemedicine Progress Note  Aayush Baptiste MD       Encounter Date and Time: February 14, 2024 at 9:34 AM    Hilda Oh, was evaluated through a synchronous (real-time) audio-video encounter. The patient (or guardian if applicable) is aware that this is a billable service, which includes applicable co-pays. This Virtual Visit was conducted with patient's (and/or legal guardian's) consent. Patient identification was verified, and a caregiver was present when appropriate.   The patient was located at Home: [address] [facility] [address]  Provider was located at Home [place]Appt Dept [place]      Chief Complaint   Patient presents with    Sinusitis     History of Present Illness   Hilda Oh is a 48 y.o. female was evaluated by synchronous (real-time) audio-video technology from home, through a secure patient portal.    Sinus congestion for 1 month.  Worse over the past couple days. Has associated sore throat and fatigue. Pain over right maxillary sinus.  No fevers, chills. Has tried OTC medications (allegra and cold medication).  Has seen ENT for this in the past.  Previously improved with Levaquin.        Review of Systems   Review of Systems   Constitutional:  Negative for chills and fever.   HENT:  Positive for sinus pressure and sinus pain.          Vitals/Objective:     General: alert, cooperative, and no distress   Mental  status: mental status: alert, oriented to person, place, and time, normal mood, behavior, speech, dress, motor activity, and thought processes   Resp: No respiratory distress   Neuro: No focal deficits   Skin: skin: no discoloration or lesions of concern on visible areas   Due to this being a TeleHealth evaluation, many elements of the physical examination are unable to be assessed.      Assessment and Plan:   1. Frequent sinus

## 2024-03-01 RX ORDER — MELOXICAM 15 MG/1
15 TABLET ORAL DAILY
COMMUNITY

## 2024-03-01 RX ORDER — RIMEGEPANT SULFATE 75 MG/75MG
TABLET, ORALLY DISINTEGRATING ORAL
COMMUNITY
Start: 2024-02-13 | End: 2024-03-04 | Stop reason: SDUPTHER

## 2024-03-04 ENCOUNTER — OFFICE VISIT (OUTPATIENT)
Age: 49
End: 2024-03-04
Payer: COMMERCIAL

## 2024-03-04 VITALS
HEART RATE: 71 BPM | OXYGEN SATURATION: 99 % | RESPIRATION RATE: 18 BRPM | WEIGHT: 195 LBS | HEIGHT: 66 IN | DIASTOLIC BLOOD PRESSURE: 84 MMHG | BODY MASS INDEX: 31.34 KG/M2 | TEMPERATURE: 97.1 F | SYSTOLIC BLOOD PRESSURE: 138 MMHG

## 2024-03-04 DIAGNOSIS — G43.709 CHRONIC MIGRAINE W/O AURA, NOT INTRACTABLE, W/O STAT MIGR: ICD-10-CM

## 2024-03-04 DIAGNOSIS — M54.81 BILATERAL OCCIPITAL NEURALGIA: Primary | ICD-10-CM

## 2024-03-04 DIAGNOSIS — G44.86 CERVICOGENIC HEADACHE: ICD-10-CM

## 2024-03-04 PROCEDURE — 99205 OFFICE O/P NEW HI 60 MIN: CPT | Performed by: PSYCHIATRY & NEUROLOGY

## 2024-03-04 RX ORDER — ELETRIPTAN HYDROBROMIDE 40 MG/1
TABLET, FILM COATED ORAL
Qty: 12 TABLET | Refills: 3 | Status: SHIPPED | OUTPATIENT
Start: 2024-03-04

## 2024-03-04 RX ORDER — KETOROLAC TROMETHAMINE 10 MG/1
10 TABLET, FILM COATED ORAL EVERY 6 HOURS PRN
Qty: 20 TABLET | Refills: 0 | Status: SHIPPED | OUTPATIENT
Start: 2024-03-04 | End: 2025-03-04

## 2024-03-04 RX ORDER — RIMEGEPANT SULFATE 75 MG/75MG
75 TABLET, ORALLY DISINTEGRATING ORAL EVERY OTHER DAY
Qty: 16 TABLET | Refills: 5 | Status: SHIPPED | OUTPATIENT
Start: 2024-03-04

## 2024-03-04 RX ORDER — ZONISAMIDE 100 MG/1
CAPSULE ORAL
COMMUNITY
Start: 2023-12-26 | End: 2024-03-04

## 2024-03-04 ASSESSMENT — PATIENT HEALTH QUESTIONNAIRE - PHQ9
SUM OF ALL RESPONSES TO PHQ QUESTIONS 1-9: 0
SUM OF ALL RESPONSES TO PHQ QUESTIONS 1-9: 0
1. LITTLE INTEREST OR PLEASURE IN DOING THINGS: 0
SUM OF ALL RESPONSES TO PHQ QUESTIONS 1-9: 0
SUM OF ALL RESPONSES TO PHQ9 QUESTIONS 1 & 2: 0
SUM OF ALL RESPONSES TO PHQ QUESTIONS 1-9: 0
2. FEELING DOWN, DEPRESSED OR HOPELESS: 0

## 2024-03-04 NOTE — PROGRESS NOTES
Chief Complaint   Patient presents with    New Patient     Patient reports she has had migraines as a senior in highschool and went to hospital and had a spinal tap  and was told it was negative.  Patient reports when she takes Nurtec every other day she has had 2 migraines in the last 30 days. Patient starts she gets pain in upper back and radiates and radiates up to back of neck and head. Patient currently seeing a neurologist in [place], Dr. Sobeida Murrell.       
  Lymphatics:  No lymphadenopathy in the neck/head.   Neck and Thyroid:   No bruits noted in the neck.   Respiratory:  Lungs clear to auscultation.   Cardiovascular:  Palpation and auscultation: regular rate and rhythm.   Extremity: No joint swelling, erythema or pedal edema.      NEUROLOGICAL EXAM:    Appearance:  The patient is obese, provides a coherent history and is in no acute distress.   Mental Status: Oriented to time, place and person. Fluent, no aphasia or dysarthria. Mood and affect appropriate.   Cranial Nerves:   Intact visual fields. VA 20/20 OU on near vision with correction. Fundi are benign. BYRON, EOM's full, no nystagmus, no ptosis. Facial sensation is normal. Corneal reflexes are intact. Facial movement is symmetric. Hearing is normal bilaterally. Palate is midline with normal elevation. Sternocleidomastoid and trapezius muscles are normal. Tongue is midline.   Motor:  5/5 strength in upper and lower proximal and distal muscles. Normal bulk and tone. No fasciculations. No pronator drift.    Reflexes:   Deep tendon reflexes 2+/4 and symmetrical. Downgoing toes.   Sensory:   Normal to cold and vibration.   Gait:  Normal gait. No Romberg. Can do tandem walking.   Tremor:   No tremor noted.   Cerebellar:  Intact FTN/BRUNILDA/HTS.   Neurovascular:  Normal heart sounds and regular rhythm, peripheral pulses intact, and no carotid bruits.     Anterior head posture with shoulders rotated in  (+) tenderness R>L occiput      Imaging  CT Head: reviewed    Labs Reviewed      Assessment:      Diagnosis Orders   1. Bilateral occipital neuralgia  ketorolac (TORADOL) 10 MG tablet      2. Cervicogenic headache  ketorolac (TORADOL) 10 MG tablet      3. Chronic migraine w/o aura, not intractable, w/o stat migr  ketorolac (TORADOL) 10 MG tablet    NURTEC 75 MG TBDP    eletriptan (RELPAX) 40 MG tablet           Plan:   Neurological examination is nonfocal.  Head CT without contrast essentially normal study.  No indication

## 2024-03-04 NOTE — PATIENT INSTRUCTIONS
your arms at your sides.  Keep your shoulders relaxed and down, not shrugged.  Squeeze your shoulder blades together. Hold for 6 seconds, then relax.  Repeat 8 to 12 times.  Chest-level pull (arms straight)    Sit or stand up straight. Grasp an exercise band with your hands about shoulder-width apart.  Relax your shoulders. With your palms facing down, hold your arms straight out in front of you.  Slowly pull straight out to the sides, squeezing your shoulder blades together. Keep your arms straight and at chest level. Do not pull your shoulders up toward your ears. Hold for 1 to 2 seconds.  Slowly return to your starting position.  Repeat 8 to 12 times.  Rowing    [place] your elastic tubing or band at about waist height. Take one end in each hand.  Sit or stand with your feet hip-width apart.  Hold your arms straight in front of you. Adjust your distance to create slight tension in the tubing or band.  Slightly tuck your chin. Relax your shoulders.  Without shrugging your shoulders, pull straight back. Your elbows will pass alongside your waist.  Pull-downs    [place] your elastic tubing or band in the top of a closed door. Take one end in each hand.  Either sit or stand, depending on what is more comfortable. If you feel unsteady, sit on a chair.  Start with your arms up and comfortably apart, elbows straight. There should be a slight tension in the tubing or band.  Slightly tuck your chin, and look straight ahead.  Keeping your back straight, slowly pull down and back, bending your elbows.  Stop where your hands are level with your chin, in a \"goalpost\" position.  Repeat 8 to 12 times.  Chest T stretch    Lie on your back with your knees bent and your feet about hip-width apart.  Tuck your chin, and relax your shoulders.  Reach your arms straight out to the sides. If you don't feel a mild stretch in your shoulders and across your chest, use a foam roll or a tightly rolled towel or blanket under your spine, from

## 2024-04-01 DIAGNOSIS — G43.709 CHRONIC MIGRAINE W/O AURA, NOT INTRACTABLE, W/O STAT MIGR: ICD-10-CM

## 2024-04-01 DIAGNOSIS — G44.86 CERVICOGENIC HEADACHE: ICD-10-CM

## 2024-04-01 DIAGNOSIS — M54.81 BILATERAL OCCIPITAL NEURALGIA: ICD-10-CM

## 2024-04-02 RX ORDER — KETOROLAC TROMETHAMINE 10 MG/1
10 TABLET, FILM COATED ORAL EVERY 6 HOURS PRN
Qty: 20 TABLET | Refills: 0 | Status: SHIPPED | OUTPATIENT
Start: 2024-04-02

## 2024-05-09 DIAGNOSIS — G43.709 CHRONIC MIGRAINE W/O AURA, NOT INTRACTABLE, W/O STAT MIGR: ICD-10-CM

## 2024-05-09 DIAGNOSIS — G44.86 CERVICOGENIC HEADACHE: ICD-10-CM

## 2024-05-09 DIAGNOSIS — M54.81 BILATERAL OCCIPITAL NEURALGIA: ICD-10-CM

## 2024-05-09 RX ORDER — KETOROLAC TROMETHAMINE 10 MG/1
10 TABLET, FILM COATED ORAL EVERY 6 HOURS PRN
Qty: 20 TABLET | Refills: 0 | Status: SHIPPED | OUTPATIENT
Start: 2024-05-09

## 2024-05-13 ENCOUNTER — TELEPHONE (OUTPATIENT)
Age: 49
End: 2024-05-13

## 2024-05-13 NOTE — TELEPHONE ENCOUNTER
Spoke with pharmacist, Amaury, at [facility] pharmacy, and informed that patient is on Nurtec and Eletriptan according to Dr. Hunt's note. Amaury verbalized understanding.

## 2024-05-13 NOTE — TELEPHONE ENCOUNTER
Calling to discuss medication:    Eletriptan    He needs to make sure she's actively using this medication due to taking Nurtec ODT 75

## 2024-06-03 DIAGNOSIS — G43.709 CHRONIC MIGRAINE W/O AURA, NOT INTRACTABLE, W/O STAT MIGR: Primary | ICD-10-CM

## 2024-06-04 ENCOUNTER — OFFICE VISIT (OUTPATIENT)
Age: 49
End: 2024-06-04

## 2024-06-04 VITALS
BODY MASS INDEX: 29.25 KG/M2 | SYSTOLIC BLOOD PRESSURE: 146 MMHG | DIASTOLIC BLOOD PRESSURE: 88 MMHG | OXYGEN SATURATION: 98 % | HEIGHT: 66 IN | HEART RATE: 88 BPM | RESPIRATION RATE: 18 BRPM | TEMPERATURE: 98.2 F | WEIGHT: 182 LBS

## 2024-06-04 DIAGNOSIS — I10 ELEVATED BLOOD PRESSURE READING WITH DIAGNOSIS OF HYPERTENSION: ICD-10-CM

## 2024-06-04 DIAGNOSIS — I10 PRIMARY HYPERTENSION: Primary | ICD-10-CM

## 2024-06-04 DIAGNOSIS — R13.10 DYSPHAGIA, UNSPECIFIED TYPE: ICD-10-CM

## 2024-06-04 DIAGNOSIS — E66.3 OVERWEIGHT (BMI 25.0-29.9): ICD-10-CM

## 2024-06-04 DIAGNOSIS — Z12.31 ENCOUNTER FOR SCREENING MAMMOGRAM FOR MALIGNANT NEOPLASM OF BREAST: ICD-10-CM

## 2024-06-04 RX ORDER — AMLODIPINE BESYLATE 5 MG/1
5 TABLET ORAL DAILY
Qty: 90 TABLET | Refills: 0 | Status: SHIPPED | OUTPATIENT
Start: 2024-06-04

## 2024-06-04 RX ORDER — CELECOXIB 200 MG/1
CAPSULE ORAL
COMMUNITY
Start: 2024-05-26

## 2024-06-04 RX ORDER — HYDROXYCHLOROQUINE SULFATE 200 MG/1
200 TABLET, FILM COATED ORAL
COMMUNITY
Start: 2024-05-31

## 2024-06-04 NOTE — PROGRESS NOTES
[facility]  [address] [place] [address]   Office [phone], Fax [phone]      Chief Complaint:     Chief Complaint   Patient presents with    Hypertension       SUBJECTIVE  Hilda Oh is an 49 y.o. female who presents for blood pressure check.    #Elevated blood pressures  - reports increased stress with ongoing divorce  - reports worsening migraines. She had new floaters today  - she denies chest pain, SOB, or other symptoms at this time  - no prior diagnosis of HTN    Dysphagia  - onset 2-3 months ago  - reporting intermittent difficulty swallowing solids  - denies abdominal pain, belching, nausea, vomiting  - no prior diagnosis of GERD  - has not taken anything for this    #HM  - due for breast cancer screen  - due for repeat lipid panel  - repeat A1c for T2DM screen  - due for colonoscopy  - Follows with OB-GYN. UTD with cervical cancer screening. Negative prior HIV and Hep C screen      Allergies   Allergies   Allergen Reactions    Topiramate Swelling     Mouth lips swelling         Medications   Current Outpatient Medications   Medication Sig    celecoxib (CELEBREX) 200 MG capsule take 1 capsule by mouth twice a day if needed    hydroxychloroquine (PLAQUENIL) 200 MG tablet Take 1 tablet by mouth    amLODIPine (NORVASC) 5 MG tablet Take 1 tablet by mouth daily    ketorolac (TORADOL) 10 MG tablet take 1 tablet by mouth every 6 hours if needed for pain    NURTEC 75 MG TBDP Take 75 mg by mouth every other day 1 tablet ON THE TONGUE AT ONSET OF HEADACHE every other day if needed    eletriptan (RELPAX) 40 MG tablet Take 1 tab at onset of severe headache; may repeat another in 2 hours if headaches persist    levocetirizine (XYZAL) 5 MG tablet Take 1 tablet by mouth as needed    metoclopramide (REGLAN) 10 MG tablet Take 1 tablet by mouth as needed    Fremanezumab-vfrm (AJOVY) 225 MG/1.5ML SOSY Inject 225 mg into the skin every 30 days (Patient not taking:

## 2024-06-04 NOTE — PATIENT INSTRUCTIONS
- check your blood pressure once daily. Goal blood pressure is less than 140/90.  - start taking the prescribed amlodipine once daily.  - follow up in x2-3 weeks  - call the provided number to schedule a mammogram  - call the provided number to establish with the GI specialist. I suspect that you will need an endoscopy as well as a screening colonoscopy

## 2024-06-04 NOTE — PROGRESS NOTES
Pt is here today for b/p check, states she is under a lot of stress causing her level to be higher than usual, she is also having more migraine.    Identified pt with two pt identifiers(name and ). Reviewed record in preparation for visit and have obtained necessary documentation.  Chief Complaint   Patient presents with    Hypertension        Vitals:    24 1435 24 1443   BP: (!) 164/97 (!) 146/88   Site: Right Upper Arm Left Upper Arm   Position: Sitting Sitting   Cuff Size: Medium Adult Medium Adult   Pulse: 88    Resp: 18    Temp: 98.2 °F (36.8 °C)    TempSrc: Oral    SpO2: 98%    Weight: 82.6 kg (182 lb)    Height: 1.676 m (5' 6\")          Coordination of Care Questionnaire:  :     \"Have you been to the ER, urgent care clinic since your last visit?  Hospitalized since your last visit?\"    NO    “Have you seen or consulted any other health care providers outside of [facility] since your last visit?”    NO    Have you had a mammogram?”   NO    No breast cancer screening on file      “Have you had a pap smear?”    NO    No cervical cancer screening on file         “Have you had a colorectal cancer screening such as a colonoscopy/FIT/Cologuard?    NO    No colonoscopy on file  No cologuard on file  No FIT/FOBT on file   No flexible sigmoidoscopy on file         Click Here for Release of Records Request

## 2024-06-05 LAB
ALBUMIN SERPL-MCNC: 4.4 G/DL (ref 3.5–5)
ALBUMIN/GLOB SERPL: 1.4 (ref 1.1–2.2)
ALP SERPL-CCNC: 122 U/L (ref 45–117)
ALT SERPL-CCNC: 20 U/L (ref 12–78)
ANION GAP SERPL CALC-SCNC: 3 MMOL/L (ref 5–15)
AST SERPL-CCNC: 14 U/L (ref 15–37)
BILIRUB SERPL-MCNC: 0.3 MG/DL (ref 0.2–1)
BUN SERPL-MCNC: 17 MG/DL (ref 6–20)
BUN/CREAT SERPL: 19 (ref 12–20)
CALCIUM SERPL-MCNC: 9.6 MG/DL (ref 8.5–10.1)
CHLORIDE SERPL-SCNC: 108 MMOL/L (ref 97–108)
CHOLEST SERPL-MCNC: 200 MG/DL
CO2 SERPL-SCNC: 27 MMOL/L (ref 21–32)
CREAT SERPL-MCNC: 0.88 MG/DL (ref 0.55–1.02)
CREAT UR-MCNC: 57.7 MG/DL
ERYTHROCYTE [DISTWIDTH] IN BLOOD BY AUTOMATED COUNT: 12.6 % (ref 11.5–14.5)
EST. AVERAGE GLUCOSE BLD GHB EST-MCNC: 100 MG/DL
GLOBULIN SER CALC-MCNC: 3.2 G/DL (ref 2–4)
GLUCOSE SERPL-MCNC: 110 MG/DL (ref 65–100)
HBA1C MFR BLD: 5.1 % (ref 4–5.6)
HCT VFR BLD AUTO: 46.2 % (ref 35–47)
HDLC SERPL-MCNC: 57 MG/DL
HDLC SERPL: 3.5 (ref 0–5)
HGB BLD-MCNC: 15.1 G/DL (ref 11.5–16)
LDLC SERPL CALC-MCNC: 123.2 MG/DL (ref 0–100)
MCH RBC QN AUTO: 27.8 PG (ref 26–34)
MCHC RBC AUTO-ENTMCNC: 32.7 G/DL (ref 30–36.5)
MCV RBC AUTO: 85.1 FL (ref 80–99)
MICROALBUMIN UR-MCNC: 0.5 MG/DL
MICROALBUMIN/CREAT UR-RTO: 9 MG/G (ref 0–30)
NRBC # BLD: 0 K/UL (ref 0–0.01)
NRBC BLD-RTO: 0 PER 100 WBC
PLATELET # BLD AUTO: 376 K/UL (ref 150–400)
PMV BLD AUTO: 10.6 FL (ref 8.9–12.9)
POTASSIUM SERPL-SCNC: 5 MMOL/L (ref 3.5–5.1)
PROT SERPL-MCNC: 7.6 G/DL (ref 6.4–8.2)
RBC # BLD AUTO: 5.43 M/UL (ref 3.8–5.2)
SODIUM SERPL-SCNC: 138 MMOL/L (ref 136–145)
TRIGL SERPL-MCNC: 99 MG/DL
VLDLC SERPL CALC-MCNC: 19.8 MG/DL
WBC # BLD AUTO: 18.1 K/UL (ref 3.6–11)

## 2024-06-06 ENCOUNTER — TELEPHONE (OUTPATIENT)
Age: 49
End: 2024-06-06

## 2024-06-06 NOTE — TELEPHONE ENCOUNTER
Spoke with patient regarding lab work.    Mildly elevated LDL. Recommended lifestyle modifications.    CMP, A1c, and microalbumin normal.    CBC with WBCs elevated to 18.1. Mild elevation on previously available lab work, but not quite to this level. Patient denies fever, chills, cough, dysuria, diarrhea, or other acute illness at this time. She does have a h/o RA which can cause elevations in WBCs. Will plan to check CBC with diff and possible smear at next follow up appointment in a few weeks time.    Patient reporting home blood pressure values remain approximately 145/90. Advised patient that if her systolic blood pressure is consistently over 140 or her diastolic blood pressure is persistently over 90, she can double her daily amlodipine dose to 10mg daily.    Aayush Osborn MD

## 2024-06-11 ENCOUNTER — OFFICE VISIT (OUTPATIENT)
Age: 49
End: 2024-06-11
Payer: COMMERCIAL

## 2024-06-11 VITALS
HEIGHT: 66 IN | DIASTOLIC BLOOD PRESSURE: 88 MMHG | SYSTOLIC BLOOD PRESSURE: 112 MMHG | TEMPERATURE: 98.1 F | HEART RATE: 94 BPM | BODY MASS INDEX: 28.45 KG/M2 | WEIGHT: 177 LBS | OXYGEN SATURATION: 98 %

## 2024-06-11 DIAGNOSIS — G44.86 CERVICOGENIC HEADACHE: ICD-10-CM

## 2024-06-11 DIAGNOSIS — G43.709 CHRONIC MIGRAINE W/O AURA, NOT INTRACTABLE, W/O STAT MIGR: ICD-10-CM

## 2024-06-11 DIAGNOSIS — M54.81 BILATERAL OCCIPITAL NEURALGIA: Primary | ICD-10-CM

## 2024-06-11 PROCEDURE — 99214 OFFICE O/P EST MOD 30 MIN: CPT | Performed by: PSYCHIATRY & NEUROLOGY

## 2024-06-11 RX ORDER — CYCLOBENZAPRINE HCL 10 MG
TABLET ORAL
Qty: 60 TABLET | Refills: 3 | Status: SHIPPED | OUTPATIENT
Start: 2024-06-11

## 2024-06-11 RX ORDER — KETOROLAC TROMETHAMINE 10 MG/1
10 TABLET, FILM COATED ORAL EVERY 6 HOURS PRN
Qty: 20 TABLET | Refills: 0 | Status: SHIPPED | OUTPATIENT
Start: 2024-06-11

## 2024-06-11 RX ORDER — ELETRIPTAN HYDROBROMIDE 40 MG/1
TABLET, FILM COATED ORAL
Qty: 12 TABLET | Refills: 5 | Status: SHIPPED | OUTPATIENT
Start: 2024-06-11

## 2024-06-11 NOTE — PROGRESS NOTES
Identified pt with two pt identifiers(name and ). Reviewed record in preparation for visit and have obtained necessary documentation. All patient medications has been reviewed.  Chief Complaint   Patient presents with    Follow-up    Migraine     Pt is still having at least 4- 7 migraines a month.       Vitals:    24 0928   BP: 112/88   Pulse: 94   Temp: 98.1 °F (36.7 °C)   SpO2: 98%                   Coordination of Care Questionnaire:   1) Have you been to an emergency room, urgent care, or hospitalized since your last visit?   no     2. Have seen or consulted any other health care provider since your last visit? no          
notes    Review of Systems:    A comprehensive review of systems was performed:   Constitutional: positive for poor appetite, fatigue  Eyes: positive for vision problems  Ears, nose, mouth, throat, and face: positive for none  Respiratory: positive for shortness of breath  Cardiovascular: positive for high blood pressure  Gastrointestinal: positive for abdominal pain  Genitourinary: positive for none  Integument/breast: positive for none  Hematologic/lymphatic: positive for none  Musculoskeletal: positive for muscle pain, joint pain  Neurological: positive for headaches, memory loss  Behavioral/Psych: positive for anxiety, depression  Endocrine: positive for none  Allergic/Immunologic: positive for none    Objective:     /88 (Site: Left Upper Arm, Position: Sitting, Cuff Size: Large Adult)   Pulse 94   Temp 98.1 °F (36.7 °C) (Temporal)   Ht 1.676 m (5' 6\")   Wt 80.3 kg (177 lb)   SpO2 98%   BMI 28.57 kg/m²     PHYSICAL EXAM:    NEUROLOGICAL EXAM:    Appearance:  The patient is obese, provides a coherent history and is in no acute distress.   Mental Status: Oriented to time, place and person. Fluent, no aphasia or dysarthria. Mood and affect appropriate.   Cranial Nerves:   II - XII were intact.   Motor:  5/5 strength. No pronator drift.    Reflexes:   Deep tendon reflexes were symmetrical.    Sensory:   Normal to cold and vibration.   Gait:  Normal gait. No Romberg.    Tremor:   No tremor noted.   Cerebellar:  Intact FTN/BRUNILDA/HTS.         Anterior head posture with shoulders rotated in    Labs Reviewed      Assessment:      Diagnosis Orders   1. Bilateral occipital neuralgia  cyclobenzaprine (FLEXERIL) 10 MG tablet    External Referral To Physical Therapy    ketorolac (TORADOL) 10 MG tablet      2. Cervicogenic headache  cyclobenzaprine (FLEXERIL) 10 MG tablet    External Referral To Physical Therapy    ketorolac (TORADOL) 10 MG tablet      3. Chronic migraine w/o aura, not intractable, w/o stat migr

## 2024-06-14 ENCOUNTER — HOSPITAL ENCOUNTER (OUTPATIENT)
Facility: HOSPITAL | Age: 49
Discharge: HOME | End: 2024-06-14
Payer: COMMERCIAL

## 2024-06-14 VITALS — HEIGHT: 66 IN | BODY MASS INDEX: 28.45 KG/M2 | WEIGHT: 177 LBS

## 2024-06-14 DIAGNOSIS — Z12.31 ENCOUNTER FOR SCREENING MAMMOGRAM FOR MALIGNANT NEOPLASM OF BREAST: ICD-10-CM

## 2024-06-14 PROCEDURE — 77063 BREAST TOMOSYNTHESIS BI: CPT

## 2024-06-18 DIAGNOSIS — G44.86 CERVICOGENIC HEADACHE: ICD-10-CM

## 2024-06-18 DIAGNOSIS — M54.81 BILATERAL OCCIPITAL NEURALGIA: Primary | ICD-10-CM

## 2024-06-18 RX ORDER — METHOCARBAMOL 750 MG/1
750 TABLET, FILM COATED ORAL
Qty: 30 TABLET | Refills: 3 | Status: SHIPPED | OUTPATIENT
Start: 2024-06-18

## 2024-06-27 ENCOUNTER — TRANSCRIBE ORDERS (OUTPATIENT)
Facility: HOSPITAL | Age: 49
End: 2024-06-27

## 2024-06-27 DIAGNOSIS — M54.16 LUMBAR RADICULITIS: ICD-10-CM

## 2024-06-27 DIAGNOSIS — M54.50 LUMBAR PAIN: Primary | ICD-10-CM

## 2024-07-02 ENCOUNTER — OFFICE VISIT (OUTPATIENT)
Age: 49
End: 2024-07-02
Payer: COMMERCIAL

## 2024-07-02 VITALS
HEART RATE: 92 BPM | DIASTOLIC BLOOD PRESSURE: 64 MMHG | RESPIRATION RATE: 18 BRPM | WEIGHT: 180.8 LBS | BODY MASS INDEX: 29.06 KG/M2 | TEMPERATURE: 98 F | HEIGHT: 66 IN | SYSTOLIC BLOOD PRESSURE: 96 MMHG | OXYGEN SATURATION: 97 %

## 2024-07-02 DIAGNOSIS — D72.829 LEUKOCYTOSIS, UNSPECIFIED TYPE: ICD-10-CM

## 2024-07-02 DIAGNOSIS — I10 PRIMARY HYPERTENSION: Primary | ICD-10-CM

## 2024-07-02 DIAGNOSIS — S99.921A INJURY OF TOE ON RIGHT FOOT, INITIAL ENCOUNTER: ICD-10-CM

## 2024-07-02 DIAGNOSIS — Z12.4 SCREENING FOR CERVICAL CANCER: ICD-10-CM

## 2024-07-02 DIAGNOSIS — R53.83 OTHER FATIGUE: ICD-10-CM

## 2024-07-02 PROCEDURE — 99214 OFFICE O/P EST MOD 30 MIN: CPT

## 2024-07-02 PROCEDURE — 3074F SYST BP LT 130 MM HG: CPT

## 2024-07-02 PROCEDURE — 3078F DIAST BP <80 MM HG: CPT

## 2024-07-02 ASSESSMENT — PATIENT HEALTH QUESTIONNAIRE - PHQ9
SUM OF ALL RESPONSES TO PHQ QUESTIONS 1-9: 0
1. LITTLE INTEREST OR PLEASURE IN DOING THINGS: NOT AT ALL
SUM OF ALL RESPONSES TO PHQ QUESTIONS 1-9: 0
2. FEELING DOWN, DEPRESSED OR HOPELESS: NOT AT ALL
SUM OF ALL RESPONSES TO PHQ QUESTIONS 1-9: 0
SUM OF ALL RESPONSES TO PHQ9 QUESTIONS 1 & 2: 0
SUM OF ALL RESPONSES TO PHQ QUESTIONS 1-9: 0

## 2024-07-02 NOTE — PATIENT INSTRUCTIONS
- call to schedule an appointment with OB-GYN  - I will be in touch regarding today's labs and image results  - continue to monitor your home blood pressure periodically  - you may take tylenol as needed for your toe pain. You can take up to 1,000mg every 8 hours. You may take ibuprofen as needed as well.

## 2024-07-02 NOTE — PROGRESS NOTES
Identified pt with two pt identifiers(name and ). Reviewed record in preparation for visit and have obtained necessary documentation.  Chief Complaint   Patient presents with    Hypertension    Discuss Labs   Pt states she is here for HTN F/U, to discuss lab work (elevated WBC) and that the provider wanted to get her labs redrawn, and a referral to OB/GYN.     Health Maintenance Due   Topic    Hepatitis B vaccine (1 of 3 - 3-dose series)    COVID-19 Vaccine (1)    HIV screen     Hepatitis C screen     DTaP/Tdap/Td vaccine (1 - Tdap)    Cervical cancer screen     Colorectal Cancer Screen        Vitals:    24 1305 24 1314   BP: (!) 85/58 96/64   Site: Left Upper Arm Right Upper Arm   Position: Sitting Sitting   Cuff Size: Medium Adult Medium Adult   Pulse: 87 92   Resp: 18    Temp: 98 °F (36.7 °C)    TempSrc: Oral    SpO2: 97%    Weight: 82 kg (180 lb 12.8 oz)    Height: 1.676 m (5' 6\")          \"Have you been to the ER, urgent care clinic since your last visit?  Hospitalized since your last visit?\"    NO    “Have you seen or consulted any other health care providers outside of [facility] since your last visit?”    Yes, 24, [facility], [facility]NT, 24, Dr. Will JAIMES     “Have you had a pap smear?”    NO    No cervical cancer screening on file         “Have you had a colorectal cancer screening such as a colonoscopy/FIT/Cologuard?    NO    No colonoscopy on file  No cologuard on file  No FIT/FOBT on file   No flexible sigmoidoscopy on file         Click Here for Release of Records Request     This patient is accompanied in the office by her self.  I have received verbal consent from Hilda Oh to discuss any/all medical information while they are present in the room.

## 2024-07-02 NOTE — PROGRESS NOTES
[facility]  [address] [place] [address]   Office [phone], Fax [phone]      Chief Complaint:     Chief Complaint   Patient presents with    Hypertension    Discuss Labs       SUBJECTIVE  Hilda Oh is an 49 y.o. female who presents for HTN follow up.    #HTN  - patient was start on amlodipine 5mg daily at last visit  - this was up-titrated to 10mg daily in the setting of elevated home blood pressures  - follows with cardiology now as well  - home blood pressure 122/86 today  - denies new or worsening headache, vision changes, chest pain    #leukocytosis   - seen on last lab work  - denies dysuria, abdominal pain, fever, chills, or other symptoms at this time    #Seasonal and environmental allergens  - got skin testing with her allergist  - will be following up routinely with them as well for ongoing testing    H/o silver (amalgam) cavities:  - requesting heavy metal testing today  - concerned that this may be contributing to her fatigue    Right toe inury  - onset Saturday  - stubbed on the door frame  - ongoing pain and bruising since the injury  - some relief with buddy taping and activity modification    Dysphagia  - intermittent, unchanged from last visit  - October 24th appointment with GI      Allergies   Allergies   Allergen Reactions    Topiramate Swelling     Mouth lips swelling         Medications   Current Outpatient Medications   Medication Sig    methocarbamol (ROBAXIN) 750 MG tablet Take 1 tablet by mouth nightly    ketorolac (TORADOL) 10 MG tablet Take 1 tablet by mouth every 6 hours as needed for Pain    eletriptan (RELPAX) 40 MG tablet Take 1 tab at onset of severe headache; may repeat another in 2 hours if headaches persist    celecoxib (CELEBREX) 200 MG capsule take 1 capsule by mouth twice a day if needed    hydroxychloroquine (PLAQUENIL) 200 MG tablet Take 1 tablet by mouth 2 times daily    amLODIPine (NORVASC) 5 MG tablet Take 1 tablet

## 2024-07-05 LAB
ARSENIC BLD-MCNC: 2 UG/L (ref 0–9)
BASOPHILS # BLD: 0.1 K/UL (ref 0–0.1)
BASOPHILS NFR BLD: 1 % (ref 0–1)
DIFFERENTIAL METHOD BLD: NORMAL
EOSINOPHIL # BLD: 0.3 K/UL (ref 0–0.4)
EOSINOPHIL NFR BLD: 3 % (ref 0–7)
ERYTHROCYTE [DISTWIDTH] IN BLOOD BY AUTOMATED COUNT: 12.5 % (ref 11.5–14.5)
HCT VFR BLD AUTO: 43.5 % (ref 35–47)
HGB BLD-MCNC: 14 G/DL (ref 11.5–16)
HISPANIC?: NORMAL
IMM GRANULOCYTES # BLD AUTO: 0 K/UL (ref 0–0.04)
IMM GRANULOCYTES NFR BLD AUTO: 0 % (ref 0–0.5)
LEAD BLD-MCNC: <1 UG/DL (ref 0–3.4)
LYMPHOCYTES # BLD: 2.2 K/UL (ref 0.8–3.5)
LYMPHOCYTES NFR BLD: 22 % (ref 12–49)
MCH RBC QN AUTO: 27.8 PG (ref 26–34)
MCHC RBC AUTO-ENTMCNC: 32.2 G/DL (ref 30–36.5)
MCV RBC AUTO: 86.5 FL (ref 80–99)
MERCURY BLD-MCNC: <1 UG/L (ref 0–14.9)
MONOCYTES # BLD: 0.7 K/UL (ref 0–1)
MONOCYTES NFR BLD: 7 % (ref 5–13)
NEUTS SEG # BLD: 6.7 K/UL (ref 1.8–8)
NEUTS SEG NFR BLD: 67 % (ref 32–75)
NRBC # BLD: 0 K/UL (ref 0–0.01)
NRBC BLD-RTO: 0 PER 100 WBC
PERIPHERAL SMEAR, MD REVIEW: NORMAL
PLATELET # BLD AUTO: 356 K/UL (ref 150–400)
PMV BLD AUTO: 10.4 FL (ref 8.9–12.9)
RACE: NORMAL
RBC # BLD AUTO: 5.03 M/UL (ref 3.8–5.2)
SPECIMEN SOURCE: NORMAL
TEST PURPOSE: NORMAL
WBC # BLD AUTO: 9.9 K/UL (ref 3.6–11)

## 2024-07-10 ENCOUNTER — HOSPITAL ENCOUNTER (OUTPATIENT)
Facility: HOSPITAL | Age: 49
Discharge: HOME | End: 2024-07-13
Payer: COMMERCIAL

## 2024-07-10 DIAGNOSIS — M54.16 LUMBAR RADICULITIS: ICD-10-CM

## 2024-07-10 DIAGNOSIS — M54.50 LUMBAR PAIN: ICD-10-CM

## 2024-07-10 PROCEDURE — 72148 MRI LUMBAR SPINE W/O DYE: CPT

## 2024-07-31 DIAGNOSIS — G43.709 CHRONIC MIGRAINE W/O AURA, NOT INTRACTABLE, W/O STAT MIGR: ICD-10-CM

## 2024-08-01 ENCOUNTER — OFFICE VISIT (OUTPATIENT)
Age: 49
End: 2024-08-01

## 2024-08-01 VITALS
SYSTOLIC BLOOD PRESSURE: 125 MMHG | BODY MASS INDEX: 29.25 KG/M2 | OXYGEN SATURATION: 97 % | TEMPERATURE: 98.1 F | WEIGHT: 182 LBS | DIASTOLIC BLOOD PRESSURE: 85 MMHG | HEART RATE: 92 BPM | HEIGHT: 66 IN

## 2024-08-01 DIAGNOSIS — Z01.818 PRE-OP EVALUATION: Primary | ICD-10-CM

## 2024-08-01 RX ORDER — HYDROCHLOROTHIAZIDE 25 MG/1
25 TABLET ORAL DAILY
COMMUNITY
Start: 2024-07-19

## 2024-08-01 NOTE — PROGRESS NOTES
Hilda Oh is a 49 y.o. female      Chief Complaint   Patient presents with    Pre-op Exam     Septum straightening surgery 8/6/2024 with Natalie JAIMES, with Dr. Solis.  Need EKG, copies of lab work and form filled out.  Needs paperwork faxed and copy give to the patient.       \"Have you been to the ER, urgent care clinic since your last visit?  Hospitalized since your last visit?\"    NO    “Have you seen or consulted any other health care providers outside of [facility] since your last visit?”    YES - When: approximately 3  weeks ago.  Where and Why: [facility] .  No cervical cancer screening on file       No colonoscopy on file  No cologuard on file  No FIT/FOBT on file   No flexible sigmoidoscopy on file         Click Here for Release of Records Request    Vitals:    08/01/24 0759   Weight: 82.6 kg (182 lb)           Medication Reconciliation Completed, changes notes. Please Update medication list.

## 2024-08-01 NOTE — PROGRESS NOTES
[facility]  [address] [place] [address]   Office [phone], Fax [phone]      Chief Complaint:     Chief Complaint   Patient presents with    Pre-op Exam     Septum straightening surgery 2024 with [facility], with Dr. Solis.  Need EKG, copies of lab work and form filled out.  Needs paperwork faxed and copy give to the patient.       SUBJECTIVE  Hilda Oh is an 49 y.o. female who presents for pre-operative evaluation    #pre-operative evaluation  - patient scheduled for septum straightening (?septoplasty) on 24 at [facility]  - Dr. Daly to perform procedure  - PSHx of knee surgery, , tonsillectomy  - she reports h/o nausea, vomiting following recovery after anesthesia. No other anesthesia complication reported  - has stopped celebrex per pre-op recommendations. Not requiring toradol at this time.  - amlodipine switched to HCTZ 25mg daily  - plaquenil 200mg BID  - nurtec every other day  - no h/o or Fhx of bleeding/clotting disorders or malignant hyperthermia  - denies tobacco, alcohol, marijuana, or recreational drug use      Allergies   Allergies   Allergen Reactions    Topiramate Swelling     Mouth lips swelling         Medications   Current Outpatient Medications   Medication Sig    hydroCHLOROthiazide (HYDRODIURIL) 25 MG tablet Take 1 tablet by mouth daily    methocarbamol (ROBAXIN) 750 MG tablet Take 1 tablet by mouth nightly    ketorolac (TORADOL) 10 MG tablet Take 1 tablet by mouth every 6 hours as needed for Pain    eletriptan (RELPAX) 40 MG tablet Take 1 tab at onset of severe headache; may repeat another in 2 hours if headaches persist    celecoxib (CELEBREX) 200 MG capsule take 1 capsule by mouth twice a day if needed    hydroxychloroquine (PLAQUENIL) 200 MG tablet Take 1 tablet by mouth 2 times daily    NURTEC 75 MG TBDP Take 75 mg by mouth every other day 1 tablet ON THE TONGUE AT ONSET OF HEADACHE every other day if

## 2024-08-02 ENCOUNTER — PATIENT MESSAGE (OUTPATIENT)
Age: 49
End: 2024-08-02

## 2024-08-05 DIAGNOSIS — G43.709 CHRONIC MIGRAINE W/O AURA, NOT INTRACTABLE, W/O STAT MIGR: ICD-10-CM

## 2024-08-05 RX ORDER — ELETRIPTAN HYDROBROMIDE 40 MG/1
TABLET, FILM COATED ORAL
Qty: 12 TABLET | Refills: 5 | Status: SHIPPED | OUTPATIENT
Start: 2024-08-05

## 2024-08-05 RX ORDER — ELETRIPTAN HYDROBROMIDE 40 MG/1
TABLET, FILM COATED ORAL
Qty: 12 TABLET | Refills: 5 | OUTPATIENT
Start: 2024-08-05

## 2024-08-26 ENCOUNTER — OFFICE VISIT (OUTPATIENT)
Age: 49
End: 2024-08-26
Payer: COMMERCIAL

## 2024-08-26 VITALS
HEART RATE: 104 BPM | TEMPERATURE: 98.4 F | DIASTOLIC BLOOD PRESSURE: 82 MMHG | WEIGHT: 184 LBS | OXYGEN SATURATION: 100 % | SYSTOLIC BLOOD PRESSURE: 126 MMHG | BODY MASS INDEX: 29.71 KG/M2

## 2024-08-26 DIAGNOSIS — R05.1 ACUTE COUGH: ICD-10-CM

## 2024-08-26 DIAGNOSIS — H60.332 ACUTE SWIMMER'S EAR OF LEFT SIDE: Primary | ICD-10-CM

## 2024-08-26 LAB
GROUP A STREP ANTIGEN, POC: NEGATIVE
INFLUENZA A ANTIGEN, POC: NEGATIVE
INFLUENZA B ANTIGEN, POC: NEGATIVE
VALID INTERNAL CONTROL, POC: NORMAL
VALID INTERNAL CONTROL, POC: NORMAL

## 2024-08-26 PROCEDURE — 87804 INFLUENZA ASSAY W/OPTIC: CPT

## 2024-08-26 PROCEDURE — 99213 OFFICE O/P EST LOW 20 MIN: CPT

## 2024-08-26 PROCEDURE — 87880 STREP A ASSAY W/OPTIC: CPT

## 2024-08-26 RX ORDER — CIPROFLOXACIN AND DEXAMETHASONE 3; 1 MG/ML; MG/ML
4 SUSPENSION/ DROPS AURICULAR (OTIC) 2 TIMES DAILY
Qty: 7.5 ML | Refills: 0 | Status: SHIPPED | OUTPATIENT
Start: 2024-08-26 | End: 2024-09-02

## 2024-08-26 NOTE — PROGRESS NOTES
[address] [place] [address]   Office [phone], Fax [phone]    Subjective:     Chief Complaint   Patient presents with    Cough     X 2 weeks , coughing a lot, ear and throat pain after nose surgery   Abx- ENT gave cipro week after       HPI:  Hilda Oh is a 49 y.o. female with a history of recent sinus surgery that presents for:    URI Concern:   Of note, recent ENT surgery on 8/6/24 for septoplasty. Treated with apophylactic cipro. Saw last week and ENT was aware of virus and not concerned for infection.   Duration of symptoms: 2 weeks  Congestion/sinus pressure: Yes  Cough: Yes, dry  Sore throat: Yes  Fever: No  Ear Pain: Yes, worse on left side   Muscle Aches: No  Shortness of Breath: Does occasionally feel its hard to get a full breath   Home Therapies: Robitussin, Delsym, Sudafed, Vitamin D, C, Zinc elderberry.    Known sick contacts: son with similar symptoms  Recent URI testing: None  No hx smoking, or asthma, or COPD  Has had recurrent sinus infections          Health Maintenance:  Health Maintenance Due   Topic Date Due    HIV screen  Never done    Hepatitis C screen  Never done    Hepatitis B vaccine (1 of 3 - 19+ 3-dose series) Never done    DTaP/Tdap/Td vaccine (1 - Tdap) Never done    Cervical cancer screen  Never done    Colorectal Cancer Screen  Never done    COVID-19 Vaccine (1 - 2023-24 season) Never done    Flu vaccine (1) Never done          Past Medical Hx  I personally reviewed.  Past Medical History:   Diagnosis Date    Arthritis     Migraines     imitrex orally and injections    Nausea & vomiting         SocHx   I personally reviewed.  Social History     Socioeconomic History    Marital status: Legally      Spouse name: Not on file    Number of children: Not on file    Years of education: Not on file    Highest education level: Not on file   Occupational History    Not on file   Tobacco Use    Smoking status: Never    Smokeless tobacco: Never    Vaping Use    Vaping status: Never Used   Substance and Sexual Activity    Alcohol use: Not Currently    Drug use: Never    Sexual activity: Not on file   Other Topics Concern    Not on file   Social History Narrative    Not on file     Social Determinants of Health     Financial Resource Strain: Patient Declined (12/5/2023)    Overall Financial Resource Strain (CARDIA)     Difficulty of Paying Living Expenses: Patient declined   Food Insecurity: Patient Declined (12/5/2023)    Hunger Vital Sign     Worried About Running Out of Food in the Last Year: Patient declined     Ran Out of Food in the Last Year: Patient declined   Transportation Needs: Unknown (12/5/2023)    PRAPARE - Transportation     Lack of Transportation (Medical): Not on file     Lack of Transportation (Non-Medical): Patient declined   Physical Activity: Not on file   Stress: Not on file   Social Connections: Not on file   Intimate Partner Violence: Not on file   Housing Stability: Unknown (12/5/2023)    Housing Stability Vital Sign     Unable to Pay for Housing in the Last Year: Not on file     Number of Places Lived in the Last Year: Not on file     Unstable Housing in the Last Year: Patient refused        Allergies  I personally reviewed.  Allergies   Allergen Reactions    Topiramate Swelling     Mouth lips swelling        Medications  I personally reviewed.  Current Outpatient Medications on File Prior to Visit   Medication Sig Dispense Refill    eletriptan (RELPAX) 40 MG tablet take 1 tablet by mouth if needed AT ONSET OF HEADACHE may repeat in 2 hours IF headache PERSISTS 12 tablet 5    hydroCHLOROthiazide (HYDRODIURIL) 25 MG tablet Take 1 tablet by mouth daily      methocarbamol (ROBAXIN) 750 MG tablet Take 1 tablet by mouth nightly 30 tablet 3    ketorolac (TORADOL) 10 MG tablet Take 1 tablet by mouth every 6 hours as needed for Pain 20 tablet 0    celecoxib (CELEBREX) 200 MG capsule take 1 capsule by mouth twice a day if needed

## 2024-08-30 ENCOUNTER — PATIENT MESSAGE (OUTPATIENT)
Age: 49
End: 2024-08-30

## 2024-09-19 ENCOUNTER — OFFICE VISIT (OUTPATIENT)
Age: 49
End: 2024-09-19
Payer: COMMERCIAL

## 2024-09-19 VITALS
OXYGEN SATURATION: 99 % | BODY MASS INDEX: 29.57 KG/M2 | HEART RATE: 86 BPM | WEIGHT: 184 LBS | HEIGHT: 66 IN | SYSTOLIC BLOOD PRESSURE: 136 MMHG | RESPIRATION RATE: 16 BRPM | DIASTOLIC BLOOD PRESSURE: 98 MMHG

## 2024-09-19 DIAGNOSIS — M47.816 LUMBAR SPONDYLOSIS: ICD-10-CM

## 2024-09-19 DIAGNOSIS — G44.86 CERVICOGENIC HEADACHE: ICD-10-CM

## 2024-09-19 DIAGNOSIS — G43.709 CHRONIC MIGRAINE W/O AURA, NOT INTRACTABLE, W/O STAT MIGR: ICD-10-CM

## 2024-09-19 DIAGNOSIS — M54.81 BILATERAL OCCIPITAL NEURALGIA: Primary | ICD-10-CM

## 2024-09-19 PROCEDURE — 99215 OFFICE O/P EST HI 40 MIN: CPT | Performed by: PSYCHIATRY & NEUROLOGY

## 2024-09-19 RX ORDER — RIMEGEPANT SULFATE 75 MG/75MG
75 TABLET, ORALLY DISINTEGRATING ORAL EVERY OTHER DAY
Qty: 16 TABLET | Refills: 5 | Status: SHIPPED | OUTPATIENT
Start: 2024-09-19

## 2024-09-19 RX ORDER — KETOROLAC TROMETHAMINE 10 MG/1
10 TABLET, FILM COATED ORAL EVERY 6 HOURS PRN
Qty: 20 TABLET | Refills: 0 | Status: SHIPPED | OUTPATIENT
Start: 2024-09-19

## 2024-09-23 DIAGNOSIS — M54.81 BILATERAL OCCIPITAL NEURALGIA: Primary | ICD-10-CM

## 2024-09-23 DIAGNOSIS — G44.86 CERVICOGENIC HEADACHE: ICD-10-CM

## 2024-09-23 RX ORDER — CHLORZOXAZONE 500 MG/1
TABLET ORAL
Qty: 90 TABLET | Refills: 2 | Status: SHIPPED | OUTPATIENT
Start: 2024-09-23

## 2024-09-30 ENCOUNTER — OFFICE VISIT (OUTPATIENT)
Age: 49
End: 2024-09-30

## 2024-09-30 VITALS
HEART RATE: 86 BPM | DIASTOLIC BLOOD PRESSURE: 80 MMHG | SYSTOLIC BLOOD PRESSURE: 118 MMHG | OXYGEN SATURATION: 98 % | TEMPERATURE: 98 F | RESPIRATION RATE: 18 BRPM | BODY MASS INDEX: 30.5 KG/M2 | WEIGHT: 189.8 LBS | HEIGHT: 66 IN

## 2024-09-30 DIAGNOSIS — L08.9 FINGER INFECTION: ICD-10-CM

## 2024-09-30 DIAGNOSIS — Z86.11 HISTORY OF TUBERCULOSIS: ICD-10-CM

## 2024-09-30 DIAGNOSIS — M54.50 ACUTE LEFT-SIDED LOW BACK PAIN WITHOUT SCIATICA: Primary | ICD-10-CM

## 2024-09-30 LAB
BILIRUBIN, URINE, POC: NEGATIVE
BLOOD URINE, POC: NEGATIVE
GLUCOSE URINE, POC: NEGATIVE
KETONES, URINE, POC: NEGATIVE
LEUKOCYTE ESTERASE, URINE, POC: NEGATIVE
NITRITE, URINE, POC: NEGATIVE
PH, URINE, POC: 6 (ref 4.6–8)
PROTEIN,URINE, POC: NEGATIVE
SPECIFIC GRAVITY, URINE, POC: 1.01 (ref 1–1.03)
URINALYSIS CLARITY, POC: CLEAR
URINALYSIS COLOR, POC: NORMAL
UROBILINOGEN, POC: NORMAL MG/DL

## 2024-09-30 RX ORDER — MUPIROCIN 20 MG/G
OINTMENT TOPICAL
Qty: 1 EACH | Refills: 0 | Status: SHIPPED | OUTPATIENT
Start: 2024-09-30 | End: 2024-10-07

## 2024-09-30 NOTE — PROGRESS NOTES
Pt here today for lower back pain, pt states she was dx with kidney stones in the past, the pain feels similar.  Pt's LT middle finger is swollen, pt states she peeled her skin about a week ago , she is applying neosporin.    Identified pt with two pt identifiers(name and ). Reviewed record in preparation for visit and have obtained necessary documentation.  No chief complaint on file.       There were no vitals filed for this visit.      Coordination of Care Questionnaire:  :     \"Have you been to the ER, urgent care clinic since your last visit?  Hospitalized since your last visit?\"    NO    “Have you seen or consulted any other health care providers outside of [facility] since your last visit?”    YES - When: approximately   ago.  Where and Why: [facility].     “Have you had a pap smear?”    NO    No cervical cancer screening on file         “Have you had a colorectal cancer screening such as a colonoscopy/FIT/Cologuard?    NO    No colonoscopy on file  No cologuard on file  No FIT/FOBT on file   No flexible sigmoidoscopy on file         Click Here for Release of Records Request

## 2024-09-30 NOTE — PROGRESS NOTES
[facility]   [facility]      Chief Complaint:   Chief Complaint   Patient presents with    Lower Back Pain       Subjective:   History: This patient is a 49 y.o. female with a chief complaint of back pain.     Left low back pain x1 week. Intermittent.  States history of 6mm kidney stone 7 years ago requiring surgery. States back pain currently feels similar to that she experienced at that time. Denies recent injury or falls. Denies dysuria, hematuria. Currently on Celebrex and other pain medications with no relief of back pain. States using heating blanket which provided relief.      Requesting tuberculosis clearance for work. Stats history of TB 21 years ago - with positive tuberculin skin test. Treated with antiTB therapy, completed course 20 years ago and subsequent repeated testing was negative. Denies fever, chills, hemoptysis, unintentional weight loss, or night sweats.     Left 3rd digit swelling and pain. States peeling the skin near the nail 1 week ago and had swelling and pain. Greenish discharge noted earlier but none since then.     Review of Systems:  General/Constitutional:  No fever, chills, sweats, fatigue, night sweats, weakness, weight loss or weight gain   Head: No headache, no trauma   Neck: No swelling, masses, stiffness, pain, or limited movement   Cardiac: No chest pain   Respiratory: No cough, shortness of breath, or dyspnea on exertion   GI: No incontinence. No nausea/vomiting, diarrhea, abdominal pain, bloody or dark stools  : No incontinence. No change in urinary habits.  Peripheral Vascular: No edema, coldness, numbness, discoloration, pain, or paresthesias   Musculoskeletal: As per HPI  Neurological: No saddle distribution paresthesia. No siatic pain. No loss of consciousness, dizziness, seizures, dysarthria, cognitive changes, problems with balance, or unilateral weakness.    Past Medical History:

## 2024-10-02 DIAGNOSIS — M54.81 BILATERAL OCCIPITAL NEURALGIA: Primary | ICD-10-CM

## 2024-10-02 DIAGNOSIS — G44.86 CERVICOGENIC HEADACHE: ICD-10-CM

## 2024-10-02 RX ORDER — TIZANIDINE 2 MG/1
TABLET ORAL
Qty: 90 TABLET | Refills: 2 | Status: SHIPPED | OUTPATIENT
Start: 2024-10-02

## 2024-10-08 ENCOUNTER — OFFICE VISIT (OUTPATIENT)
Age: 49
End: 2024-10-08

## 2024-10-08 VITALS
HEART RATE: 60 BPM | HEIGHT: 66 IN | TEMPERATURE: 98 F | BODY MASS INDEX: 30.28 KG/M2 | OXYGEN SATURATION: 96 % | RESPIRATION RATE: 20 BRPM | SYSTOLIC BLOOD PRESSURE: 115 MMHG | WEIGHT: 188.4 LBS | DIASTOLIC BLOOD PRESSURE: 80 MMHG

## 2024-10-08 DIAGNOSIS — R61 DIAPHORESIS: ICD-10-CM

## 2024-10-08 DIAGNOSIS — E07.9 THYROID MASS: ICD-10-CM

## 2024-10-08 DIAGNOSIS — R74.8 ELEVATED ALKALINE PHOSPHATASE LEVEL: ICD-10-CM

## 2024-10-08 DIAGNOSIS — R07.89 OTHER CHEST PAIN: Primary | ICD-10-CM

## 2024-10-08 LAB
25(OH)D3 SERPL-MCNC: 32.8 NG/ML (ref 30–100)
ALBUMIN SERPL-MCNC: 2.4 G/DL (ref 3.5–5)
ALBUMIN/GLOB SERPL: 0.5 (ref 1.1–2.2)
ALP SERPL-CCNC: 132 U/L (ref 45–117)
ALT SERPL-CCNC: 24 U/L (ref 12–78)
ANION GAP SERPL CALC-SCNC: 5 MMOL/L (ref 2–12)
AST SERPL-CCNC: 19 U/L (ref 15–37)
BILIRUB SERPL-MCNC: 0.5 MG/DL (ref 0.2–1)
BUN SERPL-MCNC: 13 MG/DL (ref 6–20)
BUN/CREAT SERPL: 15 (ref 12–20)
CALCIUM SERPL-MCNC: 10 MG/DL (ref 8.5–10.1)
CHLORIDE SERPL-SCNC: 104 MMOL/L (ref 97–108)
CO2 SERPL-SCNC: 28 MMOL/L (ref 21–32)
CREAT SERPL-MCNC: 0.84 MG/DL (ref 0.55–1.02)
GLOBULIN SER CALC-MCNC: 4.9 G/DL (ref 2–4)
GLUCOSE SERPL-MCNC: 91 MG/DL (ref 65–100)
POTASSIUM SERPL-SCNC: 4.5 MMOL/L (ref 3.5–5.1)
PROT SERPL-MCNC: 7.3 G/DL (ref 6.4–8.2)
SODIUM SERPL-SCNC: 137 MMOL/L (ref 136–145)

## 2024-10-08 RX ORDER — ACETAMINOPHEN 500 MG
1000 TABLET ORAL EVERY 6 HOURS PRN
COMMUNITY

## 2024-10-08 ASSESSMENT — PATIENT HEALTH QUESTIONNAIRE - PHQ9
SUM OF ALL RESPONSES TO PHQ QUESTIONS 1-9: 1
2. FEELING DOWN, DEPRESSED OR HOPELESS: SEVERAL DAYS
1. LITTLE INTEREST OR PLEASURE IN DOING THINGS: NOT AT ALL
SUM OF ALL RESPONSES TO PHQ QUESTIONS 1-9: 1
SUM OF ALL RESPONSES TO PHQ9 QUESTIONS 1 & 2: 1

## 2024-10-09 LAB — TSH SERPL DL<=0.05 MIU/L-ACNC: 0.5 UIU/ML (ref 0.45–4.5)

## 2024-10-09 NOTE — PROGRESS NOTES
I saw and evaluated the patient, performing the key elements of the service.  I discussed the findings, assessment and plan with the resident and agree with the resident's findings and plan as documented in the resident's note.   
Identified pt with two pt identifiers(name and ). Reviewed record in preparation for visit and have obtained necessary documentation.  Chief Complaint   Patient presents with    Sweats    Chest Pain   Pt states she is here to discuss her sweating and chest pain.  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 She states that the other ladies in her catering area her age aren't sweating even half as bad as she is. She states she is also having chest pain to the left upper chest. She states that the pain has started on and off for the last couple years. She states the pain comes and goes. She states the pain is an annoying pain.  She denies radiating pain down the arm, or to the jaw.  She just wants to get checked d/t her parents having cardiac issues.     Health Maintenance Due   Topic    HIV screen     Hepatitis C screen     Hepatitis B vaccine (1 of 3 - 19+ 3-dose series)    DTaP/Tdap/Td vaccine (1 - Tdap)    Cervical cancer screen     Colorectal Cancer Screen     Flu vaccine (1)    COVID-19 Vaccine ( season)       Vitals:    10/08/24 0900   BP: 115/80   Site: Left Upper Arm   Position: Sitting   Cuff Size: Medium Adult   Pulse: 60   Resp: 20   Temp: 98 °F (36.7 °C)   TempSrc: Oral   SpO2: 96%   Weight: 85.5 kg (188 lb 6.4 oz)   Height: 1.676 m (5' 6\")         \"Have you been to the ER, urgent care clinic since your last visit?  Hospitalized since your last visit?\"    NO    “Have you seen or consulted any other health care providers outside of [facility] since your last visit?”    Yes, 2024,[facility], cortisone shots     “Have you had a pap smear?”    NO    No cervical cancer screening on file         “Have you had a colorectal cancer screening such as a colonoscopy/FIT/Cologuard?    NO    No colonoscopy on file  No cologuard on file  No FIT/FOBT on file   No flexible sigmoidoscopy on file         Click Here for Release of Records Request 
Unclear etiology. Will obtain TSH today to screen for hyperthyroidism. Differential also includes medication side-effect (routine eletriptan medication use). May also be secondary to vasomotor symptoms/hot flashes  - Thyroid Cascade Profile; Future  - Vitamin D 25 Hydroxy; Future    3. Thyroid mass: thyromegaly with palpable mass to left thyroid.  - Thyroid Cascade Profile; Future  - US HEAD NECK SOFT TISSUE THYROID; Future    4. Elevated alkaline phosphatase level: Mild elevation at last check. If persistently elevated, can consider fractionation or RUQ US.  - Comprehensive Metabolic Panel; Future  - Vitamin D 25 Hydroxy; Future        Return in about 2 months (around 12/8/2024), or if symptoms worsen or fail to improve.      I have discussed the diagnosis with the patient and the intended plan as seen in the above orders. Patient verbalized understanding of the plan and agrees with the plan. The patient has received an after-visit summary and questions were answered concerning future plans.  I have discussed medication side effects and warnings with the patient as well. Informed patient to return to the office if new symptoms arise.        Aayush Osborn MD  Patient seen and discussed with attending Dr. Schaefer

## 2024-10-14 ENCOUNTER — HOSPITAL ENCOUNTER (OUTPATIENT)
Facility: HOSPITAL | Age: 49
Discharge: HOME | End: 2024-10-17
Payer: COMMERCIAL

## 2024-10-14 DIAGNOSIS — E07.9 THYROID MASS: ICD-10-CM

## 2024-10-14 PROCEDURE — 76536 US EXAM OF HEAD AND NECK: CPT

## 2024-10-15 ENCOUNTER — HOSPITAL ENCOUNTER (OUTPATIENT)
Facility: HOSPITAL | Age: 49
Setting detail: RECURRING SERIES
Discharge: HOME | End: 2024-10-18
Payer: COMMERCIAL

## 2024-10-15 PROCEDURE — 97112 NEUROMUSCULAR REEDUCATION: CPT

## 2024-10-15 PROCEDURE — 97162 PT EVAL MOD COMPLEX 30 MIN: CPT

## 2024-10-15 PROCEDURE — 97140 MANUAL THERAPY 1/> REGIONS: CPT

## 2024-10-15 NOTE — THERAPY EVALUATION
MMT   R L R L R L   Flexion (180) 150 150   5 5   Extension (60)         Abduction (180) 125 130   4 4   Adduction (0)         IR (70) WNL WNL   4 4   ER (90) C7 C7   4 4   Horizontal Abduction (130)         Horizontal Adduction (45)         Additional comments: tightness and mild pain especially with abduction         Strength:  Pt is R handed   Date: R L   10/15/24 42 lbs 33 lbs            Neurological: reports recent increase in tingling sensation in both hands   When having migraine reports loud noise/light sensitivity   Reflexes/Sensations: sensation reported WNL    Special Tests: none performed    Objective/Functional Outcome Measure: Daily Activity  AM-PAC: 34.76%  AM-PAC score = an established functional score where 0% = no disability       29 min [x]Eval - untimed                        Therapeutic Procedures:  Tx Min Billable or 1:1 Min (if diff from Tx Min) Procedure, Rationale, Specifics   16  21168 Manual Therapy (timed):  decrease pain, increase tissue extensibility, and decrease trigger points to improve patient's ability to progress to PLOF and address remaining functional goals.  The manual therapy interventions were performed at a separate and distinct time from the therapeutic activities interventions . (see flow sheet as applicable)    Details if applicable:  myofascial release B UT and suboccipital release   10  05099 Neuromuscular Re-Education (timed):  improve balance, coordination, kinesthetic sense, posture, core stability and proprioception to improve patient's ability to develop conscious control of individual muscles and awareness of position of extremities in order to progress to PLOF and address remaining functional goals. (see flow sheet as applicable)    Details if applicable:  postural realignment routine   26     Total Total           Modalities Rationale:     decrease pain and increase tissue extensibility to improve patient's ability to progress to PLOF and address

## 2024-10-22 ENCOUNTER — HOSPITAL ENCOUNTER (OUTPATIENT)
Facility: HOSPITAL | Age: 49
Setting detail: RECURRING SERIES
Discharge: HOME | End: 2024-10-25
Payer: COMMERCIAL

## 2024-10-22 PROCEDURE — 97112 NEUROMUSCULAR REEDUCATION: CPT

## 2024-10-22 PROCEDURE — 97140 MANUAL THERAPY 1/> REGIONS: CPT

## 2024-10-22 PROCEDURE — 97110 THERAPEUTIC EXERCISES: CPT

## 2024-10-22 NOTE — PROGRESS NOTES
due to soreness after first visit. Added additional stretches and reviewed HEP. Pt had significant shoulder tightness limiting ROM with TYI. Discussed performing exercises in comfortable range and not over-stretching. Pt verbalized understanding. Reviewed dry needling safety concerns.  Patient will continue to benefit from skilled PT / OT services to modify and progress therapeutic interventions, analyze and address soft tissue restrictions, analyze and cue for proper movement patterns, and analyze and modify for postural abnormalities to address functional deficits and attain remaining goals.    Progress toward goals / Updated goals:  []  See Progress Note/Recertification    Short Term Goals: To be accomplished in 8 treatments.  Pt will report compliance to a progressive HEP to assist in rehab progression.   [] Met [x] Progressing [] Not Met  Date: 10/22/24 compliant with initial HEP  Pt will demonstrate improved postural awareness in sitting and standing and will utilize techniques to correct posture.   [] Met [] Progressing [] Not Met  Date:   Pt will report decrease in frequency and intensity of migraine headaches by 25%.   [] Met [] Progressing [] Not Met  Date:   Long Term Goals: To be accomplished in 15 treatments.  Pt will report improved AMPAC score by 10% indicating improved function.   [] Met [] Progressing [] Not Met  Date:   Pt will report decrease in frequency of migraine headaches to average of 1x/week.   [] Met [] Progressing [] Not Met  Date:   Pt will report decrease in duration of migraine headache symptoms by 50%.   [] Met [] Progressing [] Not Met  Date:    PLAN  Yes  Continue plan of care  Re-Cert Due: after 15 visits  []  Upgrade activities as tolerated  []  Discharge due to:  [x]  Other: interested in dry needling      Kimberly Miller, PT       10/22/2024       8:25 AM

## 2024-10-24 ENCOUNTER — HOSPITAL ENCOUNTER (OUTPATIENT)
Facility: HOSPITAL | Age: 49
Setting detail: RECURRING SERIES
Discharge: HOME | End: 2024-10-27
Payer: COMMERCIAL

## 2024-10-24 PROCEDURE — 97140 MANUAL THERAPY 1/> REGIONS: CPT

## 2024-10-24 PROCEDURE — 97110 THERAPEUTIC EXERCISES: CPT

## 2024-10-24 PROCEDURE — 20560 NDL INSJ W/O NJX 1 OR 2 MUSC: CPT

## 2024-10-24 NOTE — PROGRESS NOTES
2  PHYSICAL THERAPY - DAILY TREATMENT NOTE (updated 3/23)      Date: 10/24/2024          Patient Name:  Hilda Oh :  1975   Medical   Diagnosis:  Bilateral occipital neuralgia [M54.81]  Cervicogenic headache [G44.86] Treatment Diagnosis:  M54.2, G89.29  CHRONIC NECK PAIN    Referral Source:  Roddy Hunt Jr.,* Insurance:   Payor: [facility] / Plan: [facility] / Product Type: *No Product type* /                     Patient  verified yes     Visit #   Current  / Total 818am 916am   Time   In / Out 3 15   Total Treatment Time 58   Total Timed Codes 43         SUBJECTIVE    Pain Level (0-10 scale): 2/10    Any medication changes, allergies to medications, adverse drug reactions, diagnosis change, or new procedure performed?: [x] No    [] Yes (see summary sheet for update)  Medications: Verified on Patient Summary List    Subjective functional status/changes:     Pt tolerated last session well without significant soreness or side effects. No pain around the shoulder blades.  She states she got paid so she wants to try dry needling today. She is going to work later.    OBJECTIVE      Therapeutic Procedures:  Tx Min Billable or 1:1 Min (if diff from Tx Min) Procedure, Rationale, Specifics   33  31778 Therapeutic Exercise (timed):  increase ROM, strength, coordination, balance, and proprioception to improve patient's ability to progress to PLOF and address remaining functional goals. (see flow sheet as applicable)     Details if applicable:     10  15415 Manual Therapy (timed):  increase tissue extensibility and decrease trigger points to improve patient's ability to progress to PLOF and address remaining functional goals.  The manual therapy interventions were performed at a separate and distinct time from the therapeutic activities interventions . (see flow sheet as applicable)     Details if applicable:       15784 Neuromuscular Re-Education (timed):  improve balance, coordination, kinesthetic sense,

## 2024-10-29 ENCOUNTER — HOSPITAL ENCOUNTER (OUTPATIENT)
Facility: HOSPITAL | Age: 49
Setting detail: RECURRING SERIES
Discharge: HOME | End: 2024-11-01
Payer: COMMERCIAL

## 2024-10-29 PROCEDURE — 97110 THERAPEUTIC EXERCISES: CPT

## 2024-10-29 PROCEDURE — 20561 NDL INSJ W/O NJX 3+ MUSC: CPT

## 2024-10-29 NOTE — PROGRESS NOTES
2  PHYSICAL THERAPY - DAILY TREATMENT NOTE (updated 3/23)      Date: 10/29/2024          Patient Name:  Hilda Oh :  1975   Medical   Diagnosis:  Bilateral occipital neuralgia [M54.81]  Cervicogenic headache [G44.86] Treatment Diagnosis:  M54.2, G89.29  CHRONIC NECK PAIN    Referral Source:  Roddy Hunt Jr.,* Insurance:   Payor: [facility] / Plan: [facility] / Product Type: *No Product type* /                     Patient  verified yes     Visit #   Current  / Total 823am 917am   Time   In / Out 4 15   Total Treatment Time 54   Total Timed Codes 34         SUBJECTIVE    Pain Level (0-10 scale): 5/10    Any medication changes, allergies to medications, adverse drug reactions, diagnosis change, or new procedure performed?: [x] No    [] Yes (see summary sheet for update)  Medications: Verified on Patient Summary List    Subjective functional status/changes:     Pt reports tolerating dry needling well without significant soreness. She states on Friday she came home after a field trip with her kids and found her dog had . The stress has been impacting her muscle tightness and she is starting to feel some headache symptoms.    OBJECTIVE      Therapeutic Procedures:  Tx Min Billable or 1:1 Min (if diff from Tx Min) Procedure, Rationale, Specifics   29  46659 Therapeutic Exercise (timed):  increase ROM, strength, coordination, balance, and proprioception to improve patient's ability to progress to PLOF and address remaining functional goals. (see flow sheet as applicable)     Details if applicable:     5  11324 Manual Therapy (timed):  increase tissue extensibility and decrease trigger points to improve patient's ability to progress to PLOF and address remaining functional goals.  The manual therapy interventions were performed at a separate and distinct time from the therapeutic activities interventions . (see flow sheet as applicable)     Details if applicable:       23805 Neuromuscular

## 2024-10-30 ENCOUNTER — TELEPHONE (OUTPATIENT)
Age: 49
End: 2024-10-30

## 2024-10-30 NOTE — TELEPHONE ENCOUNTER
----- Message from Jordan KEBEDE sent at 10/30/2024  3:01 PM EDT -----  Regarding: ECC Escalation To Practice  ECC Escalation To Practice      Type of Escalation: Acute Care Symptom  --------------------------------------------------------------------------------------------------------------------------    Information for Provider:  Patient is looking for appointment for: Symptom Depression/anxiety    Additional Information having trouble of sleeping    --------------------------------------------------------------------------------------------------------------------------    Relationship to Patient: Self     Call Back Info: OK to leave message on voicemail  Preferred Call Back Number: Phone [phone]

## 2024-10-31 ENCOUNTER — APPOINTMENT (OUTPATIENT)
Facility: HOSPITAL | Age: 49
End: 2024-10-31
Payer: COMMERCIAL

## 2024-11-08 ENCOUNTER — TELEMEDICINE (OUTPATIENT)
Age: 49
End: 2024-11-08
Payer: COMMERCIAL

## 2024-11-08 DIAGNOSIS — F33.1 MODERATE EPISODE OF RECURRENT MAJOR DEPRESSIVE DISORDER (HCC): Primary | ICD-10-CM

## 2024-11-08 PROCEDURE — 99213 OFFICE O/P EST LOW 20 MIN: CPT

## 2024-11-08 RX ORDER — ESCITALOPRAM OXALATE 10 MG/1
10 TABLET ORAL DAILY
Qty: 90 TABLET | Refills: 0 | Status: SHIPPED | OUTPATIENT
Start: 2024-11-08

## 2024-11-08 RX ORDER — TRAZODONE HYDROCHLORIDE 50 MG/1
50 TABLET, FILM COATED ORAL NIGHTLY
Qty: 90 TABLET | Refills: 1 | Status: SHIPPED | OUTPATIENT
Start: 2024-11-08

## 2024-11-08 NOTE — PROGRESS NOTES
visit: Yes, I confirm.        Total time spent for this encounter: Not billed by time    --Aayush Osborn MD on 2024 at 3:04 PM    An electronic signature was used to authenticate this note.      History   Patients past medical, surgical and family histories were reviewed and updated.      Past Medical History:   Diagnosis Date    Arthritis     Migraines     imitrex orally and injections    Nausea & vomiting      Past Surgical History:   Procedure Laterality Date     SECTION      times 4 pain at epidural site continues    OTHER SURGICAL HISTORY      remove scar tissue after c section    TONSILLECTOMY AND ADENOIDECTOMY      age 23    WISDOM TOOTH EXTRACTION       Family History   Problem Relation Age of Onset    Neuropathy Mother     Stroke Mother     Fibromyalgia Mother     Heart Failure Mother     Headache Mother      Social History     Tobacco Use    Smoking status: Never    Smokeless tobacco: Never   Vaping Use    Vaping status: Never Used   Substance Use Topics    Alcohol use: Yes     Comment: rare    Drug use: Never     Patient Active Problem List   Diagnosis    Migraines          Current Medications/Allergies   Medications and Allergies reviewed:    Current Outpatient Medications   Medication Sig Dispense Refill    acetaminophen (TYLENOL) 500 MG tablet Take 2 tablets by mouth every 6 hours as needed for Pain      tiZANidine (ZANAFLEX) 2 MG tablet Take 1 at bedtime x 1 wk; then 1 BID x 1 wk; then 1 TID 90 tablet 2    chlorzoxazone (PARAFON FORTE) 500 MG tablet Take 1 at bedtime x 1 week; then 1 tab twice a day x 1 week; then 1 tab three times a day (Patient not taking: Reported on 10/8/2024) 90 tablet 2    NURTEC 75 MG TBDP Take 75 mg by mouth every other day 1 tablet ON THE TONGUE AT ONSET OF HEADACHE every other day if needed 16 tablet 5    ketorolac (TORADOL) 10 MG tablet Take 1 tablet by mouth every 6 hours as needed for Pain 20 tablet 0    eletriptan (RELPAX) 40 MG tablet take 1 tablet by

## 2024-11-14 ENCOUNTER — HOSPITAL ENCOUNTER (OUTPATIENT)
Facility: HOSPITAL | Age: 49
Setting detail: RECURRING SERIES
Discharge: HOME | End: 2024-11-17
Payer: COMMERCIAL

## 2024-11-14 PROCEDURE — 97110 THERAPEUTIC EXERCISES: CPT

## 2024-11-14 PROCEDURE — 20561 NDL INSJ W/O NJX 3+ MUSC: CPT

## 2024-11-14 NOTE — PROGRESS NOTES
2  PHYSICAL THERAPY - DAILY TREATMENT NOTE (updated 3/23)      Date: 2024          Patient Name:  Hilda Oh :  1975   Medical   Diagnosis:  Bilateral occipital neuralgia [M54.81]  Cervicogenic headache [G44.86] Treatment Diagnosis:  M54.2, G89.29  CHRONIC NECK PAIN    Referral Source:  Roddy Hunt Jr.,* Insurance:   Payor: [facility] / Plan: [facility] / Product Type: *No Product type* /                     Patient  verified yes     Visit #   Current  / Total 5 15   Time   In / Out 956am 1112am   Total Treatment Time 72   Total Timed Codes 32         SUBJECTIVE    Pain Level (0-10 scale): 5/10 (10/10 this morning prior to ibuprofen)    Any medication changes, allergies to medications, adverse drug reactions, diagnosis change, or new procedure performed?: [x] No    [] Yes (see summary sheet for update)  Medications: Verified on Patient Summary List    Subjective functional status/changes:     Pt reports good benefit from dry needling, no soreness after last visit.    OBJECTIVE      Therapeutic Procedures:  Tx Min Billable or 1:1 Min (if diff from Tx Min) Procedure, Rationale, Specifics   28  66393 Therapeutic Exercise (timed):  increase ROM, strength, coordination, balance, and proprioception to improve patient's ability to progress to PLOF and address remaining functional goals. (see flow sheet as applicable)     Details if applicable:     4  93246 Manual Therapy (timed):  increase tissue extensibility and decrease trigger points to improve patient's ability to progress to PLOF and address remaining functional goals.  The manual therapy interventions were performed at a separate and distinct time from the therapeutic activities interventions . (see flow sheet as applicable)     Details if applicable:       92637 Neuromuscular Re-Education (timed):  improve balance, coordination, kinesthetic sense, posture, core stability and proprioception to improve patient's ability to develop conscious

## 2024-11-19 ENCOUNTER — HOSPITAL ENCOUNTER (OUTPATIENT)
Facility: HOSPITAL | Age: 49
Setting detail: RECURRING SERIES
Discharge: HOME | End: 2024-11-22
Payer: COMMERCIAL

## 2024-11-19 PROCEDURE — 97110 THERAPEUTIC EXERCISES: CPT

## 2024-11-19 PROCEDURE — 97140 MANUAL THERAPY 1/> REGIONS: CPT

## 2024-11-19 NOTE — PROGRESS NOTES
[facility] [address], [address]  [place] [address]  Ph: [phone]     Fax: [phone]    PHYSICAL THERAPY PROGRESS NOTE  Patient Name:  Hilda Oh :  1975   Treatment/Medical Diagnosis: Bilateral occipital neuralgia [M54.81]  Cervicogenic headache [G44.86]   Referral Source:  Roddy Hunt Jr.,*     Date of Initial Visit:  10/15/24 Attended Visits:  6 Missed Visits:  1     SUMMARY OF TREATMENT/ASSESSMENT:  Pt is progressing well with PT services, noting a decrease in frequency in her migraine headaches. She is compliant with her HEP and is working on improved postural awareness. Treatment sessions have focused on postural correction, improving spinal alignment, and reducing muscle tightness/tension. We have utilized dry needling, massage, and gentle exercises with HEP. Plan to continue per POC to further reduce headache symptoms and reduce muscle tightness to allow maximum participation with community/household activities with her children with less pain.     CURRENT STATUS/GOALS:  Short Term Goals: To be accomplished in 8 treatments.  Pt will report compliance to a progressive HEP to assist in rehab progression.              [x] Met [] Progressing [] Not Met  Date: 24 pt demonstrates knowledge of technique with exercises  Pt will demonstrate improved postural awareness in sitting and standing and will utilize techniques to correct posture.              [] Met [x] Progressing [] Not Met  Date: 24 improved awareness  Pt will report decrease in frequency and intensity of migraine headaches by 25%.              [] Met [x] Progressing [] Not Met  Date: 24 less frequent but not less intense  Long Term Goals: To be accomplished in 15 treatments.  Pt will report improved AMPAC score by 10% indicating improved function.              [] Met [] Progressing [x] Not Met  Date: 24  Pt will report decrease in frequency of migraine

## 2024-11-19 NOTE — PROGRESS NOTES
2  PHYSICAL THERAPY - DAILY TREATMENT NOTE (updated 3/23)      Date: 2024          Patient Name:  Hilda Oh :  1975   Medical   Diagnosis:  Bilateral occipital neuralgia [M54.81]  Cervicogenic headache [G44.86] Treatment Diagnosis:  M54.2, G89.29  CHRONIC NECK PAIN    Referral Source:  Roddy Hunt Jr.,* Insurance:   Payor: [facility] / Plan: [facility] / Product Type: *No Product type* /                     Patient  verified yes     Visit #   Current  / Total 6 15   Time   In / Out 1045am 1133am   Total Treatment Time 45   Total Timed Codes 45         SUBJECTIVE    Pain Level (0-10 scale): 3/10    Any medication changes, allergies to medications, adverse drug reactions, diagnosis change, or new procedure performed?: [x] No    [] Yes (see summary sheet for update)  Medications: Verified on Patient Summary List    Subjective functional status/changes:     Has liver doctor appt today. She is anxious about the results of her bloodwork.  Pt had forehead pain from the last dry needling session with some bruising over her eyebrows. She thinks this caused her headaches to worsen over the weekend. \"I don't want to try that one again\"    OBJECTIVE      Therapeutic Procedures:  Tx Min Billable or 1:1 Min (if diff from Tx Min) Procedure, Rationale, Specifics   25  97346 Therapeutic Exercise (timed):  increase ROM, strength, coordination, balance, and proprioception to improve patient's ability to progress to PLOF and address remaining functional goals. (see flow sheet as applicable)     Details if applicable:  progress report, exercises per flow sheet   20  58352 Manual Therapy (timed):  increase tissue extensibility and decrease trigger points to improve patient's ability to progress to PLOF and address remaining functional goals.  The manual therapy interventions were performed at a separate and distinct time from the therapeutic activities interventions . (see flow sheet as applicable)     Details

## 2024-11-21 ENCOUNTER — HOSPITAL ENCOUNTER (OUTPATIENT)
Facility: HOSPITAL | Age: 49
Setting detail: RECURRING SERIES
Discharge: HOME | End: 2024-11-24
Payer: COMMERCIAL

## 2024-11-21 PROCEDURE — 97110 THERAPEUTIC EXERCISES: CPT

## 2024-11-21 PROCEDURE — 97140 MANUAL THERAPY 1/> REGIONS: CPT

## 2024-11-21 PROCEDURE — 20560 NDL INSJ W/O NJX 1 OR 2 MUSC: CPT

## 2024-11-21 NOTE — PROGRESS NOTES
2  PHYSICAL THERAPY - DAILY TREATMENT NOTE (updated 3/23)      Date: 2024          Patient Name:  Hilda Oh :  1975   Medical   Diagnosis:  Bilateral occipital neuralgia [M54.81]  Cervicogenic headache [G44.86] Treatment Diagnosis:  M54.2, G89.29  CHRONIC NECK PAIN    Referral Source:  Roddy Hunt Jr.,* Insurance:   Payor: [facility] / Plan: [facility] / Product Type: *No Product type* /                     Patient  verified yes     Visit #   Current  / Total 7 15   Time   In / Out 1002am 1100am   Total Treatment Time 58   Total Timed Codes 38         SUBJECTIVE    Pain Level (0-10 scale): 3/10    Any medication changes, allergies to medications, adverse drug reactions, diagnosis change, or new procedure performed?: [x] No    [] Yes (see summary sheet for update)  Medications: Verified on Patient Summary List    Subjective functional status/changes:     Pt tried the washcloth wall slide, felt a pull in her neck and that is a little sore now.    OBJECTIVE      Therapeutic Procedures:  Tx Min Billable or 1:1 Min (if diff from Tx Min) Procedure, Rationale, Specifics   28  39817 Therapeutic Exercise (timed):  increase ROM, strength, coordination, balance, and proprioception to improve patient's ability to progress to PLOF and address remaining functional goals. (see flow sheet as applicable)     Details if applicable:  progress report, exercises per flow sheet   10  03864 Manual Therapy (timed):  increase tissue extensibility and decrease trigger points to improve patient's ability to progress to PLOF and address remaining functional goals.  The manual therapy interventions were performed at a separate and distinct time from the therapeutic activities interventions . (see flow sheet as applicable)     Details if applicable:  subscapularis release, pec minor release, soft tissue assessment pre/post dry needling     29114 Neuromuscular Re-Education (timed):  improve balance, coordination,

## 2024-11-26 ENCOUNTER — HOSPITAL ENCOUNTER (OUTPATIENT)
Facility: HOSPITAL | Age: 49
Setting detail: RECURRING SERIES
Discharge: HOME | End: 2024-11-29
Payer: COMMERCIAL

## 2024-11-26 PROCEDURE — 97110 THERAPEUTIC EXERCISES: CPT

## 2024-11-26 PROCEDURE — 97140 MANUAL THERAPY 1/> REGIONS: CPT

## 2024-11-26 NOTE — PROGRESS NOTES
needling     80948 Neuromuscular Re-Education (timed):  improve balance, coordination, kinesthetic sense, posture, core stability and proprioception to improve patient's ability to develop conscious control of individual muscles and awareness of position of extremities in order to progress to PLOF and address remaining functional goals. (see flow sheet as applicable)    Details if applicable:  postural realignment routine             46     Total Total       [x]  Patient Education billed concurrently with other procedures   [x] Review HEP    [x] Progressed/Changed HEP, detail: added wall slides and scapular retraction with theraband  [] Other detail:           Other Objective/Functional Measures    Objective/Functional Outcome Measure: Daily Activity  AM-PAC: 32.04% (initial 34.76%)  AM-PAC score = an established functional score where 0% = no disability     Pain Level at end of session (0-10 scale): 3/10       Assessment   Pt demonstrates improved scapular awareness with exercises, requiring less cues for form and relaxing the shoulders. She was able to demonstrate scapular protraction with serratus plus on wall, but had some over-recruitment of the UT on the R side. Tolerated manual therapy well with palpable release of L>R suboccipital muscles.  Patient will continue to benefit from skilled PT / OT services to modify and progress therapeutic interventions, analyze and address soft tissue restrictions, analyze and cue for proper movement patterns, and analyze and modify for postural abnormalities to address functional deficits and attain remaining goals.    Progress toward goals / Updated goals:  []  See Progress Note/Recertification    Short Term Goals: To be accomplished in 8 treatments.  Pt will report compliance to a progressive HEP to assist in rehab progression.   [x] Met [] Progressing [] Not Met  Date: 11/14/24 pt demonstrates knowledge of technique with exercises  Pt will demonstrate improved postural

## 2024-12-03 ENCOUNTER — HOSPITAL ENCOUNTER (OUTPATIENT)
Facility: HOSPITAL | Age: 49
Setting detail: RECURRING SERIES
Discharge: HOME | End: 2024-12-06
Payer: COMMERCIAL

## 2024-12-03 PROCEDURE — 20561 NDL INSJ W/O NJX 3+ MUSC: CPT

## 2024-12-03 PROCEDURE — 97140 MANUAL THERAPY 1/> REGIONS: CPT

## 2024-12-03 PROCEDURE — 97110 THERAPEUTIC EXERCISES: CPT

## 2024-12-03 NOTE — PROGRESS NOTES
2  PHYSICAL THERAPY - DAILY TREATMENT NOTE (updated 3/23)      Date: 12/3/2024          Patient Name:  Hilda Oh :  1975   Medical   Diagnosis:  Bilateral occipital neuralgia [M54.81]  Cervicogenic headache [G44.86] Treatment Diagnosis:  M54.2, G89.29  CHRONIC NECK PAIN    Referral Source:  Roddy Hunt Jr.,* Insurance:   Payor: [facility] / Plan: [facility] / Product Type: *No Product type* /                     Patient  verified yes     Visit #   Current  / Total 9 15   Time   In / Out 1141am 1235pm   Total Treatment Time 49   Total Timed Codes 29         SUBJECTIVE    Pain Level (0-10 scale): 2-3/10    Any medication changes, allergies to medications, adverse drug reactions, diagnosis change, or new procedure performed?: [x] No    [] Yes (see summary sheet for update)  Medications: Verified on Patient Summary List    Subjective functional status/changes:     Pt is a little sore after the holidays, had some increased headaches, she doesn't know if it was related to stress due to personal issues. She wonders if increased headaches and soreness are related to increase time between dry needling sessions.    OBJECTIVE      Therapeutic Procedures:  Tx Min Billable or 1:1 Min (if diff from Tx Min) Procedure, Rationale, Specifics   19  96739 Therapeutic Exercise (timed):  increase ROM, strength, coordination, balance, and proprioception to improve patient's ability to progress to PLOF and address remaining functional goals. (see flow sheet as applicable)     Details if applicable:  progress report, exercises per flow sheet   10  25692 Manual Therapy (timed):  increase tissue extensibility and decrease trigger points to improve patient's ability to progress to PLOF and address remaining functional goals.  The manual therapy interventions were performed at a separate and distinct time from the therapeutic activities interventions . (see flow sheet as applicable)     Details if applicable:

## 2024-12-05 ENCOUNTER — HOSPITAL ENCOUNTER (OUTPATIENT)
Facility: HOSPITAL | Age: 49
Setting detail: RECURRING SERIES
Discharge: HOME | End: 2024-12-08
Payer: COMMERCIAL

## 2024-12-05 PROCEDURE — 97110 THERAPEUTIC EXERCISES: CPT

## 2024-12-05 PROCEDURE — 97140 MANUAL THERAPY 1/> REGIONS: CPT

## 2024-12-05 PROCEDURE — 20561 NDL INSJ W/O NJX 3+ MUSC: CPT

## 2024-12-05 NOTE — PROGRESS NOTES
2  PHYSICAL THERAPY - DAILY TREATMENT NOTE (updated 3/23)      Date: 2024          Patient Name:  Hilda Oh :  1975   Medical   Diagnosis:  Bilateral occipital neuralgia [M54.81]  Cervicogenic headache [G44.86] Treatment Diagnosis:  M54.2, G89.29  CHRONIC NECK PAIN    Referral Source:  Roddy Hunt Jr.,* Insurance:   [facility]or: [facility] / Product Type: *No Product type* /                     Patient  verified yes     Visit #   Current  / Total 10 15   Time   In / Out 1042am 1136am   Total Treatment Time 54   Total Timed Codes 44         SUBJECTIVE    Pain Level (0-10 scale): 6/10 at R suboccipitals, with mild headache    Any medication changes, allergies to medications, adverse drug reactions, diagnosis change, or new procedure performed?: [x] No    [] Yes (see summary sheet for update)  Medications: Verified on Patient Summary List    Subjective functional status/changes:     Pt felt good benefit from dry needling, sore for only a few hours and felt looser.  She woke up today with soreness on the R suboccipitals.    OBJECTIVE      Therapeutic Procedures:  Tx Min Billable or 1:1 Min (if diff from Tx Min) Procedure, Rationale, Specifics   20  34563 Therapeutic Exercise (timed):  increase ROM, strength, coordination, balance, and proprioception to improve patient's ability to progress to PLOF and address remaining functional goals. (see flow sheet as applicable)     Details if applicable:  exercises per flow sheet   24  93576 Manual Therapy (timed):  increase tissue extensibility and decrease trigger points to improve patient's ability to progress to PLOF and address remaining functional goals.  The manual therapy interventions were performed at a separate and distinct time from the therapeutic activities interventions . (see flow sheet as applicable)     Details if applicable:  soft tissue assessment pre/post dry needling, suboccipital release, pec minor release     [id number]

## 2024-12-10 ENCOUNTER — HOSPITAL ENCOUNTER (OUTPATIENT)
Facility: HOSPITAL | Age: 49
Setting detail: RECURRING SERIES
Discharge: HOME | End: 2024-12-13
Payer: COMMERCIAL

## 2024-12-10 PROCEDURE — 97110 THERAPEUTIC EXERCISES: CPT

## 2024-12-10 PROCEDURE — 97140 MANUAL THERAPY 1/> REGIONS: CPT

## 2024-12-10 NOTE — PROGRESS NOTES
2  PHYSICAL THERAPY - DAILY TREATMENT NOTE (updated 3/23)      Date: 12/10/2024          Patient Name:  Hilda Oh :  1975   Medical   Diagnosis:  Bilateral occipital neuralgia [M54.81]  Cervicogenic headache [G44.86] Treatment Diagnosis:  M54.2, G89.29  CHRONIC NECK PAIN    Referral Source:  Roddy Hunt Jr.,* Insurance:   [place] [facility] / [facility]: [facility] / Product Type: *No Product type* /                     Patient  verified yes     Visit #   Current  / Total 11 15   Time   In / Out 954am 1056am   Total Treatment Time 59   Total Timed Codes 49         SUBJECTIVE    Pain Level (0-10 scale): 4/10    Any medication changes, allergies to medications, adverse drug reactions, diagnosis change, or new procedure performed?: [x] No    [] Yes (see summary sheet for update)  Medications: Verified on Patient Summary List    Subjective functional status/changes:     States her daughter is in a wheelchair due to LE pain, possible back injury - she has been pushing her around and tweaked her achilles pushing the chair up a ramp.   Has had a stressful time this week dealing with her daughter's injury, ortho appt and bloodwork, while getting ready to finalize her divorce next week.   Shoulders/neck are not feeling worse than normal. She doesn't have money to do dry needling today so wants to focus on strengthening and soft tissue massage.    OBJECTIVE      Therapeutic Procedures:  Tx Min Billable or 1:1 Min (if diff from Tx Min) Procedure, Rationale, Specifics   39  99407 Therapeutic Exercise (timed):  increase ROM, strength, coordination, balance, and proprioception to improve patient's ability to progress to PLOF and address remaining functional goals. (see flow sheet as applicable)     Details if applicable:  exercises per flow sheet   10  28540 Manual Therapy (timed):  increase tissue extensibility and decrease trigger points to improve patient's ability to progress to PLOF and address remaining

## 2024-12-12 ENCOUNTER — APPOINTMENT (OUTPATIENT)
Facility: HOSPITAL | Age: 49
End: 2024-12-12
Payer: COMMERCIAL

## 2024-12-17 ENCOUNTER — HOSPITAL ENCOUNTER (OUTPATIENT)
Facility: HOSPITAL | Age: 49
Setting detail: RECURRING SERIES
Discharge: HOME | End: 2024-12-20
Payer: COMMERCIAL

## 2024-12-17 PROCEDURE — 97110 THERAPEUTIC EXERCISES: CPT

## 2024-12-17 PROCEDURE — 97140 MANUAL THERAPY 1/> REGIONS: CPT

## 2024-12-17 PROCEDURE — 20561 NDL INSJ W/O NJX 3+ MUSC: CPT

## 2024-12-17 NOTE — PROGRESS NOTES
field. Needle inserted into trigger point in muscle belly, superior/anterior angulation and pistoning performed.  Suboccipitals: 2 needles; first inserted at base of skull prison between external occipital protuberance and mastoid process, second inserted just posterior to mastoid process Inserted to depth of approximately 20-25 mm with angulation toward opposite side eye and left in situ 5-10 min  Infraspinatus: Trigger point targeted in proximal muscle belly, needle inserted from posterior to anterior angulation with bony endfeel achieved.  Teres major/minor: Needle inserted prison between acromion process and axillary crease with posterior to anterior angulation, pistoning performed, approximately 30mm depth.    Response: Localized Twitch Response, Improve Soft Tissue Mobility, Reduced Tightness, and Post Needle Soreness    Education: Purpose or rationale of dry needling                      Side effects and possible adverse effects of the treatment                      The need to report the use of blood thinners and/or immunosuppressant medications                      How to respond to possible adverse effects of the treatment                       Self-treatment of post needling soreness (ice/heat, stretching, activity modification)                      Opportunity was given to ask questions; all questions were answered    Modalities Rationale:     decrease pain and increase tissue extensibility to improve patient's ability to progress to PLOF and address remaining functional goals.       min [] Estim Unattended,             type/location:       []  w/ice    []  w/heat        min [] Estim Attended,             type/location:       []  w/ice   []  w/heat         []  w/US   []  TENS insruct        min []  Ultrasound,         settings/location:     10 min  unbilled []  Ice     [x]  Heat            location/position:     min []  Other:        Skin assessment post-treatment (if applicable):    [x]  intact    []

## 2024-12-26 ENCOUNTER — HOSPITAL ENCOUNTER (OUTPATIENT)
Facility: HOSPITAL | Age: 49
Setting detail: RECURRING SERIES
Discharge: HOME | End: 2024-12-29
Payer: COMMERCIAL

## 2024-12-26 PROCEDURE — 97110 THERAPEUTIC EXERCISES: CPT

## 2024-12-26 PROCEDURE — 97140 MANUAL THERAPY 1/> REGIONS: CPT

## 2024-12-26 NOTE — PROGRESS NOTES
applicable)     Details if applicable:  soft tissue release and assessment pre/post dry needling     90187 Neuromuscular Re-Education (timed):  improve balance, coordination, kinesthetic sense, posture, core stability and proprioception to improve patient's ability to develop conscious control of individual muscles and awareness of position of extremities in order to progress to PLOF and address remaining functional goals. (see flow sheet as applicable)    Details if applicable:  postural realignment routine             49     Total Total       Modalities Rationale:     decrease pain and increase tissue extensibility to improve patient's ability to progress to PLOF and address remaining functional goals.       min [] Estim Unattended,             type/location:       []  w/ice    []  w/heat        min [] Estim Attended,             type/location:       []  w/ice   []  w/heat         []  w/US   []  TENS insruct        min []  Ultrasound,         settings/location:     8 min  unbilled []  Ice     [x]  Heat            location/position:     min []  Other:        Skin assessment post-treatment (if applicable):    [x]  intact    []  redness- no adverse reaction                 []redness - adverse reaction:               [x]  Patient Education billed concurrently with other procedures   [x] Review HEP    [] Progressed/Changed HEP, detail: sidelying abd/ER, prone scap retraction and horizontal abduction  [] Other detail:           Other Objective/Functional Measures    Cervical SPINE ROM:             Flexion:                      50 degrees, reports tightness   Extension:                 33 degrees  Right Sidebend:        30 degrees, reports more tightness   Left Sidebend:           45 degrees  Rotation Right:          62 degrees  Rotation Left:             65 degrees  Additional comments: tightness     Specific joints:   SHOULDER                                         AROM                        PROM

## 2024-12-26 NOTE — THERAPY RECERTIFICATION
DATE:_________   TIME:________                           Roddy Hunt Jr.,*    ** Signature, Date and Time must be completed for valid certification **  Please sign and fax to [phone].  Thank you

## 2024-12-29 DIAGNOSIS — G44.86 CERVICOGENIC HEADACHE: ICD-10-CM

## 2024-12-29 DIAGNOSIS — M54.81 BILATERAL OCCIPITAL NEURALGIA: ICD-10-CM

## 2024-12-30 RX ORDER — TIZANIDINE 2 MG/1
TABLET ORAL
Qty: 90 TABLET | Refills: 2 | Status: SHIPPED | OUTPATIENT
Start: 2024-12-30

## 2025-01-03 ENCOUNTER — HOSPITAL ENCOUNTER (OUTPATIENT)
Facility: HOSPITAL | Age: 50
Setting detail: RECURRING SERIES
Discharge: HOME | End: 2025-01-06
Payer: COMMERCIAL

## 2025-01-03 PROCEDURE — 20560 NDL INSJ W/O NJX 1 OR 2 MUSC: CPT

## 2025-01-03 PROCEDURE — 97110 THERAPEUTIC EXERCISES: CPT

## 2025-01-03 PROCEDURE — 97140 MANUAL THERAPY 1/> REGIONS: CPT

## 2025-01-03 NOTE — PROGRESS NOTES
PHYSICAL THERAPY - DAILY TREATMENT NOTE (updated 3/23)      Date: 1/3/2025          Patient Name:  Hilda Oh :  1975   Medical   Diagnosis:  Bilateral occipital neuralgia [M54.81]  Cervicogenic headache [G44.86] Treatment Diagnosis:  M54.2, G89.29  CHRONIC NECK PAIN    Referral Source:  Roddy Hunt Jr.,* Insurance:   [facility] / Product Type: *No Product type* /       Patient  verified yes     Visit #   Current  / Total 14 22   Time   In / Out 101pm 210pm   Total Treatment Time 65   Total Timed Codes 45   1:1 Treatment Time 45      [facility] Totals Reminder:  bill using total billable   min of TIMED therapeutic procedures and modalities.   8-22 min = 1 unit; 23-37 min = 2 units; 38-52 min = 3 units; 53-67 min = 4 units; 68-82 min = 5 units          SUBJECTIVE    Pain Level (0-10 scale): 3/10    Any medication changes, allergies to medications, adverse drug reactions, diagnosis change, or new procedure performed?: [x] No    [] Yes (see summary sheet for update)  Medications: Verified on Patient Summary List    Subjective functional status/changes:     Pt had trouble with the prone exercises, had severe mid-back pain.    OBJECTIVE      Therapeutic Procedures:  Tx Min Billable or 1:1 Min (if diff from Tx Min) Procedure, Rationale, Specifics   35  13826 Therapeutic Exercise (timed):  increase ROM, strength, coordination, balance, and proprioception to improve patient's ability to progress to PLOF and address remaining functional goals. (see flow sheet as applicable)     Details if applicable:     10  54511 Manual Therapy (timed):  increase tissue extensibility and decrease trigger points to improve patient's ability to progress to PLOF and address remaining functional goals.  The manual therapy interventions were performed at a separate and distinct time from the therapeutic activities interventions . (see flow sheet as applicable)     Details if applicable:  soft

## 2025-01-09 ENCOUNTER — HOSPITAL ENCOUNTER (OUTPATIENT)
Facility: HOSPITAL | Age: 50
Setting detail: RECURRING SERIES
Discharge: HOME | End: 2025-01-12
Payer: COMMERCIAL

## 2025-01-09 PROCEDURE — 97110 THERAPEUTIC EXERCISES: CPT

## 2025-01-09 NOTE — PROGRESS NOTES
PHYSICAL THERAPY - DAILY TREATMENT NOTE (updated 3/23)      Date: 2025          Patient Name:  Hilda Oh :  1975   Medical   Diagnosis:  Bilateral occipital neuralgia [M54.81]  Cervicogenic headache [G44.86] Treatment Diagnosis:  M54.2, G89.29  CHRONIC NECK PAIN    Referral Source:  Roddy Hunt Jr.,* Insurance:   [place] [facility] / [facility]: [facility] / Product Type: *No Product type* /       Patient  verified yes     Visit #   Current  / Total 15 22   Time   In / Out 917am 1007am   Total Treatment Time 50   Total Timed Codes 40   1:1 Treatment Time 40       [facility] Totals Reminder:  bill using total billable   min of TIMED therapeutic procedures and modalities.   8-22 min = 1 unit; 23-37 min = 2 units; 38-52 min = 3 units; 53-67 min = 4 units; 68-82 min = 5 units          SUBJECTIVE    Pain Level (0-10 scale): 1/10    Any medication changes, allergies to medications, adverse drug reactions, diagnosis change, or new procedure performed?: [x] No    [] Yes (see summary sheet for update)  Medications: Verified on Patient Summary List    Subjective functional status/changes:     Pt states her shoulders and neck are feeling good today, no headache and pain 1/10 or less. More pain in the lower back and knees.    OBJECTIVE      Therapeutic Procedures:  Tx Min Billable or 1:1 Min (if diff from Tx Min) Procedure, Rationale, Specifics   40  04656 Therapeutic Exercise (timed):  increase ROM, strength, coordination, balance, and proprioception to improve patient's ability to progress to PLOF and address remaining functional goals. (see flow sheet as applicable)     Details if applicable:  reviewed HEP and provided updated list of exercises     93456 Manual Therapy (timed):  increase tissue extensibility and decrease trigger points to improve patient's ability to progress to PLOF and address remaining functional goals.  The manual therapy interventions were performed at a separate and distinct time

## 2025-01-16 ENCOUNTER — APPOINTMENT (OUTPATIENT)
Facility: HOSPITAL | Age: 50
End: 2025-01-16
Payer: COMMERCIAL

## 2025-01-17 ENCOUNTER — HOSPITAL ENCOUNTER (OUTPATIENT)
Facility: HOSPITAL | Age: 50
Setting detail: RECURRING SERIES
Discharge: HOME | End: 2025-01-20
Payer: COMMERCIAL

## 2025-01-17 PROCEDURE — 97140 MANUAL THERAPY 1/> REGIONS: CPT

## 2025-01-17 PROCEDURE — 20560 NDL INSJ W/O NJX 1 OR 2 MUSC: CPT

## 2025-01-17 NOTE — PROGRESS NOTES
PHYSICAL THERAPY - DAILY TREATMENT NOTE (updated 3/23)      Date: 2025          Patient Name:  Hilda Oh :  1975   Medical   Diagnosis:  Bilateral occipital neuralgia [M54.81]  Cervicogenic headache [G44.86] Treatment Diagnosis:  M54.2, G89.29  CHRONIC NECK PAIN    Referral Source:  Roddy Hunt Jr.,* Insurance:   [facility]: [facility] / Product Type: *No Product type* /       Patient  verified yes     Visit #   Current  / Total 16 22   Time   In / Out 803am 900am   Total Treatment Time 55   Total Timed Codes 30   1:1 Treatment Time 30       [facility] Totals Reminder:  bill using total billable   min of TIMED therapeutic procedures and modalities.   8-22 min = 1 unit; 23-37 min = 2 units; 38-52 min = 3 units; 53-67 min = 4 units; 68-82 min = 5 units          SUBJECTIVE    Pain Level (0-10 scale): 8/10    Any medication changes, allergies to medications, adverse drug reactions, diagnosis change, or new procedure performed?: [x] No    [] Yes (see summary sheet for update)  Medications: Verified on Patient Summary List    Subjective functional status/changes:     Pt woke up with a migraine today. She thinks it is due to the busyness with the snow and dealing with her mom.  She has had 3 migraines this week.    OBJECTIVE      Therapeutic Procedures:  Tx Min Billable or 1:1 Min (if diff from Tx Min) Procedure, Rationale, Specifics     38064 Therapeutic Exercise (timed):  increase ROM, strength, coordination, balance, and proprioception to improve patient's ability to progress to PLOF and address remaining functional goals. (see flow sheet as applicable)     Details if applicable:  reviewed HEP and provided updated list of exercises   30  80500 Manual Therapy (timed):  increase tissue extensibility and decrease trigger points to improve patient's ability to progress to PLOF and address remaining functional goals.  The manual therapy interventions were performed at a separate and

## 2025-01-24 ENCOUNTER — HOSPITAL ENCOUNTER (OUTPATIENT)
Facility: HOSPITAL | Age: 50
Setting detail: RECURRING SERIES
Discharge: HOME | End: 2025-01-27
Payer: COMMERCIAL

## 2025-01-24 PROCEDURE — 97140 MANUAL THERAPY 1/> REGIONS: CPT

## 2025-01-24 PROCEDURE — 97110 THERAPEUTIC EXERCISES: CPT

## 2025-01-24 NOTE — PROGRESS NOTES
PHYSICAL THERAPY - DAILY TREATMENT NOTE (updated 3/23)      Date: 2025          Patient Name:  Hilda Oh :  1975   Medical   Diagnosis:  Bilateral occipital neuralgia [M54.81]  Cervicogenic headache [G44.86] Treatment Diagnosis:  M54.2, G89.29  CHRONIC NECK PAIN    Referral Source:  Roddy Hunt Jr.,* Insurance:   [facility]: [facility] / Product Type: *No Product type* /       Patient  verified yes     Visit #   Current  / Total 17 22   Time   In / Out 802am 900am   Total Treatment Time 55   Total Timed Codes 45   1:1 Treatment Time 45      [facility] Totals Reminder:  bill using total billable   min of TIMED therapeutic procedures and modalities.   8-22 min = 1 unit; 23-37 min = 2 units; 38-52 min = 3 units; 53-67 min = 4 units; 68-82 min = 5 units          SUBJECTIVE    Pain Level (0-10 scale): /10    Any medication changes, allergies to medications, adverse drug reactions, diagnosis change, or new procedure performed?: [x] No    [] Yes (see summary sheet for update)  Medications: Verified on Patient Summary List    Subjective functional status/changes:     Pt has had one headache this week. She did the strengthening exercises twice, but has been stretching a lot. A little sore around the shoulders due to scraping ice, but she feels the stretches have been helping. Continues with lots of family stress.    OBJECTIVE      Therapeutic Procedures:  Tx Min Billable or 1:1 Min (if diff from Tx Min) Procedure, Rationale, Specifics   37  38909 Therapeutic Exercise (timed):  increase ROM, strength, coordination, balance, and proprioception to improve patient's ability to progress to PLOF and address remaining functional goals. (see flow sheet as applicable)     Details if applicable:     8  27337 Manual Therapy (timed):  increase tissue extensibility and decrease trigger points to improve patient's ability to progress to PLOF and address remaining functional goals.  The manual

## 2025-01-30 DIAGNOSIS — F33.1 MODERATE EPISODE OF RECURRENT MAJOR DEPRESSIVE DISORDER (HCC): ICD-10-CM

## 2025-01-31 ENCOUNTER — HOSPITAL ENCOUNTER (OUTPATIENT)
Facility: HOSPITAL | Age: 50
Setting detail: RECURRING SERIES
End: 2025-01-31
Payer: COMMERCIAL

## 2025-01-31 PROCEDURE — 20561 NDL INSJ W/O NJX 3+ MUSC: CPT

## 2025-01-31 PROCEDURE — 97140 MANUAL THERAPY 1/> REGIONS: CPT

## 2025-01-31 PROCEDURE — 97110 THERAPEUTIC EXERCISES: CPT

## 2025-01-31 NOTE — PROGRESS NOTES
activities interventions . (see flow sheet as applicable)     Details if applicable:  soft tissue release UT, pec minor, suboccipital release     92204 Neuromuscular Re-Education (timed):  improve balance, coordination, kinesthetic sense, posture, core stability and proprioception to improve patient's ability to develop conscious control of individual muscles and awareness of position of extremities in order to progress to PLOF and address remaining functional goals. (see flow sheet as applicable)    Details if applicable:  postural realignment routine             39     Total Total       Modalities Rationale:     decrease pain and increase tissue extensibility to improve patient's ability to progress to PLOF and address remaining functional goals.       min [] Estim Unattended,             type/location:       []  w/ice    []  w/heat        min [] Estim Attended,             type/location:       []  w/ice   []  w/heat         []  w/US   []  TENS insruct        min []  Ultrasound,         settings/location:     15 min  unbilled []  Ice     [x]  Heat            location/position: To B shoulders in supine    min []  Other:        Skin assessment post-treatment (if applicable):    [x]  intact    []  redness- no adverse reaction                 []redness - adverse reaction:          5   min Dry Needling without E-Stim         (not to be included in total timed codes     or 1:1 Treatment Time)     min Dry Needling with E-Stim: Attended      Type:       Muscles:     min Dry Needling with E-Stim: Unattended      Type:       Muscles:      Billing:  []  20560 Needle Insertion w/o Injection (1-2 muscles) (un-timed)                        [x]  20561 Needle Insertion w/o Injection (3+ muscles) (un-timed)         [x]  Script obtained from MD specifying \"Dry Needling\"    Written Informed Consent Obtained and Document Included in Chart: YES    Rationale/Necessity: deactivate trigger points, improve muscle spasms, eliminate muscle  imbalances,                                      and decrease myofascial pain to improve patient's ability to progress to PLOF   and address remaining functional goals.    Muscles Treated:  [x]  Bilateral: UT, suboccipitals, infraspinatus                                 [x]  Right: teres vanessa/min                                 []  Left:                            [x]  Hemostasis applied after each needle application     Procedure: Intramuscular Dry Needling was done with a 0.3x40/50mm (shoulder), 0.23 x 30mm (cervical) gauge solid monofilament needle,                       under aseptic technique .  Suboccipitals: 2 needles; first inserted at base of skull retirement between external occipital protuberance and mastoid process, second inserted just posterior to mastoid process. Inserted to depth of approximately 20-25 mm with angulation toward opposite side eye and left in situ 5-10min.   Upper trap: Muscle lifted from chest wall to avoid lung field. Needle inserted into trigger point in muscle belly, superior/anterior angulation and pistoning performed. Localized twitch response achieved.  Infraspinatus: Trigger point targeted in proximal muscle belly, needle inserted from posterior to anterior angulation with bony endfeel achieved.   Teres major/minor: Needle inserted one fingerwidth superior to axillary crease with posterior to anterior angulation, 35mm depth. Localized twitch response achieved.    Response: Localized Twitch Response, Increased ROM, Improve Soft Tissue Mobility, Reduced Tightness, and Post Needle Soreness    Education: Purpose or rationale of dry needling                      Side effects and possible adverse effects of the treatment                      The need to report the use of blood thinners and/or immunosuppressant medications                      How to respond to possible adverse effects of the treatment                       Self-treatment of post needling soreness (ice/heat, stretching,

## 2025-01-31 NOTE — TELEPHONE ENCOUNTER
Medication Refill Request    Hilda Oh is requesting a refill of the following medication(s):   Requested Prescriptions     Pending Prescriptions Disp Refills    escitalopram (LEXAPRO) 10 MG tablet [Pharmacy Med Name: ESCITALOPRAM 10 MG TABLET] 90 tablet 0     Sig: take 1 tablet by mouth once daily        Listed PCP is Aayush Osborn MD   Last provider to prescribe medication: Dr. Osborn on 11/08/2024  Date of Last Office Visit at [facility]: 11/8/2024 with Dr. Osborn    FUTURE [facility] APPOINTMENT: Visit date not found    Please send refill to:    [id number] - [place], [place] - [address] [phone] - F [phone]  [address] [address]  Phone: [phone] Fax: [phone]      Please review request and approve or deny with recommendations within 48 hours.

## 2025-02-03 RX ORDER — ESCITALOPRAM OXALATE 10 MG/1
10 TABLET ORAL DAILY
Qty: 90 TABLET | Refills: 0 | Status: SHIPPED | OUTPATIENT
Start: 2025-02-03

## 2025-02-07 ENCOUNTER — HOSPITAL ENCOUNTER (OUTPATIENT)
Facility: HOSPITAL | Age: 50
Setting detail: RECURRING SERIES
Discharge: HOME | End: 2025-02-10
Payer: COMMERCIAL

## 2025-02-07 PROCEDURE — 97140 MANUAL THERAPY 1/> REGIONS: CPT

## 2025-02-07 PROCEDURE — 97110 THERAPEUTIC EXERCISES: CPT

## 2025-02-07 NOTE — PROGRESS NOTES
PHYSICAL THERAPY - DAILY TREATMENT NOTE (updated 3/23)      Date: 2025          Patient Name:  Hilda Oh :  1975   Medical   Diagnosis:  Bilateral occipital neuralgia [M54.81]  Cervicogenic headache [G44.86] Treatment Diagnosis:  M54.2, G89.29  CHRONIC NECK PAIN    Referral Source:  Roddy Hunt Jr.,* Insurance:   [facility] / [facility]: [facility] / Product Type: *No Product type* /       Patient  verified yes     Visit #   Current  / Total 19 22   Time   In / Out 804am 857am   Total Treatment Time 53   Total Timed Codes 43   1:1 Treatment Time 43      [facility] Totals Reminder:  bill using total billable   min of TIMED therapeutic procedures and modalities.   8-22 min = 1 unit; 23-37 min = 2 units; 38-52 min = 3 units; 53-67 min = 4 units; 68-82 min = 5 units          SUBJECTIVE    Pain Level (0-10 scale): 1/10    Any medication changes, allergies to medications, adverse drug reactions, diagnosis change, or new procedure performed?: [x] No    [] Yes (see summary sheet for update)  Medications: Verified on Patient Summary List    Subjective functional status/changes:     Had 3 headaches this week and some soreness around the R arm.   She was more sore after the dry needling for about one day, but felt looser afterward.     OBJECTIVE      Therapeutic Procedures:  Tx Min Billable or 1:1 Min (if diff from Tx Min) Procedure, Rationale, Specifics   35  02305 Therapeutic Exercise (timed):  increase ROM, strength, coordination, balance, and proprioception to improve patient's ability to progress to PLOF and address remaining functional goals. (see flow sheet as applicable)     Details if applicable:     8  38330 Manual Therapy (timed):  increase tissue extensibility and decrease trigger points to improve patient's ability to progress to PLOF and address remaining functional goals.  The manual therapy interventions were performed at a separate and distinct time from the therapeutic activities

## 2025-02-14 ENCOUNTER — HOSPITAL ENCOUNTER (OUTPATIENT)
Facility: HOSPITAL | Age: 50
Setting detail: RECURRING SERIES
Discharge: HOME | End: 2025-02-17
Payer: COMMERCIAL

## 2025-02-14 PROCEDURE — 97110 THERAPEUTIC EXERCISES: CPT

## 2025-02-14 PROCEDURE — 97140 MANUAL THERAPY 1/> REGIONS: CPT

## 2025-02-14 PROCEDURE — 97535 SELF CARE MNGMENT TRAINING: CPT

## 2025-02-14 PROCEDURE — 20561 NDL INSJ W/O NJX 3+ MUSC: CPT

## 2025-02-14 NOTE — PROGRESS NOTES
PHYSICAL THERAPY - DAILY TREATMENT NOTE (updated 3/23)      Date: 2025          Patient Name:  Hilda Oh :  1975   Medical   Diagnosis:  Bilateral occipital neuralgia [M54.81]  Cervicogenic headache [G44.86] Treatment Diagnosis:  M54.2, G89.29  CHRONIC NECK PAIN    Referral Source:  Roddy Hunt Jr.,* [place]:   [place] / Plan: [facility] [place] / Product Type: *No Product type* /       Patient  verified yes     Visit #   Current  / Total 20 22   Time   In / Out 808am 905am   Total Treatment Time 57   Total Timed Codes 47   1:1 Treatment Time 47      [facility] Totals Reminder:  bill using total billable   min of TIMED therapeutic procedures and modalities.   8-22 min = 1 unit; 23-37 min = 2 units; 38-52 min = 3 units; 53-67 min = 4 units; 68-82 min = 5 units          SUBJECTIVE    Pain Level (0-10 scale): 1/10    Any medication changes, allergies to medications, adverse drug reactions, diagnosis change, or new procedure performed?: [x] No    [] Yes (see summary sheet for update)  Medications: Verified on Patient Summary List    Subjective functional status/changes:     Shoulder felt better after taking a few days off the strengthening exercises.     OBJECTIVE      Therapeutic Procedures:  Tx Min Billable or 1:1 Min (if diff from Tx Min) Procedure, Rationale, Specifics   14  80584 Therapeutic Exercise (timed):  increase ROM, strength, coordination, balance, and proprioception to improve patient's ability to progress to PLOF and address remaining functional goals. (see flow sheet as applicable)     Details if applicable:       65944 Manual Therapy (timed):  increase tissue extensibility and decrease trigger points to improve patient's ability to progress to PLOF and address remaining functional goals.  The manual therapy interventions were performed at a separate and distinct time from the therapeutic activities interventions . (see flow sheet as applicable)     Details if

## 2025-02-21 ENCOUNTER — APPOINTMENT (OUTPATIENT)
Facility: HOSPITAL | Age: 50
End: 2025-02-21
Payer: COMMERCIAL

## 2025-02-26 ENCOUNTER — OFFICE VISIT (OUTPATIENT)
Age: 50
End: 2025-02-26
Payer: COMMERCIAL

## 2025-02-26 VITALS
HEART RATE: 86 BPM | BODY MASS INDEX: 32.78 KG/M2 | DIASTOLIC BLOOD PRESSURE: 77 MMHG | SYSTOLIC BLOOD PRESSURE: 116 MMHG | TEMPERATURE: 98.3 F | RESPIRATION RATE: 18 BRPM | HEIGHT: 66 IN | WEIGHT: 204 LBS | OXYGEN SATURATION: 97 %

## 2025-02-26 DIAGNOSIS — M79.604 LEG PAIN, BILATERAL: ICD-10-CM

## 2025-02-26 DIAGNOSIS — Z11.59 ENCOUNTER FOR HEPATITIS C SCREENING TEST FOR LOW RISK PATIENT: ICD-10-CM

## 2025-02-26 DIAGNOSIS — Z12.4 SCREENING FOR CERVICAL CANCER: ICD-10-CM

## 2025-02-26 DIAGNOSIS — R74.8 ELEVATED ALKALINE PHOSPHATASE LEVEL: ICD-10-CM

## 2025-02-26 DIAGNOSIS — F33.41 RECURRENT MAJOR DEPRESSIVE DISORDER, IN PARTIAL REMISSION: Primary | ICD-10-CM

## 2025-02-26 DIAGNOSIS — Z11.4 SCREENING FOR HIV (HUMAN IMMUNODEFICIENCY VIRUS): ICD-10-CM

## 2025-02-26 DIAGNOSIS — M79.605 LEG PAIN, BILATERAL: ICD-10-CM

## 2025-02-26 PROCEDURE — 99214 OFFICE O/P EST MOD 30 MIN: CPT

## 2025-02-26 RX ORDER — TRAZODONE HYDROCHLORIDE 50 MG/1
50 TABLET ORAL NIGHTLY
Qty: 90 TABLET | Refills: 0 | Status: SHIPPED | OUTPATIENT
Start: 2025-02-26

## 2025-02-26 RX ORDER — ESCITALOPRAM OXALATE 20 MG/1
20 TABLET ORAL DAILY
Qty: 90 TABLET | Refills: 2 | Status: SHIPPED | OUTPATIENT
Start: 2025-02-26

## 2025-02-26 SDOH — ECONOMIC STABILITY: FOOD INSECURITY: WITHIN THE PAST 12 MONTHS, THE FOOD YOU BOUGHT JUST DIDN'T LAST AND YOU DIDN'T HAVE MONEY TO GET MORE.: NEVER TRUE

## 2025-02-26 SDOH — ECONOMIC STABILITY: FOOD INSECURITY: WITHIN THE PAST 12 MONTHS, YOU WORRIED THAT YOUR FOOD WOULD RUN OUT BEFORE YOU GOT MONEY TO BUY MORE.: NEVER TRUE

## 2025-02-26 ASSESSMENT — PATIENT HEALTH QUESTIONNAIRE - PHQ9
6. FEELING BAD ABOUT YOURSELF - OR THAT YOU ARE A FAILURE OR HAVE LET YOURSELF OR YOUR FAMILY DOWN: SEVERAL DAYS
SUM OF ALL RESPONSES TO PHQ QUESTIONS 1-9: 11
5. POOR APPETITE OR OVEREATING: MORE THAN HALF THE DAYS
2. FEELING DOWN, DEPRESSED OR HOPELESS: SEVERAL DAYS
SUM OF ALL RESPONSES TO PHQ QUESTIONS 1-9: 11
SUM OF ALL RESPONSES TO PHQ QUESTIONS 1-9: 11
8. MOVING OR SPEAKING SO SLOWLY THAT OTHER PEOPLE COULD HAVE NOTICED. OR THE OPPOSITE, BEING SO FIGETY OR RESTLESS THAT YOU HAVE BEEN MOVING AROUND A LOT MORE THAN USUAL: NOT AT ALL
SUM OF ALL RESPONSES TO PHQ QUESTIONS 1-9: 11
7. TROUBLE CONCENTRATING ON THINGS, SUCH AS READING THE NEWSPAPER OR WATCHING TELEVISION: SEVERAL DAYS
9. THOUGHTS THAT YOU WOULD BE BETTER OFF DEAD, OR OF HURTING YOURSELF: NOT AT ALL
1. LITTLE INTEREST OR PLEASURE IN DOING THINGS: SEVERAL DAYS
4. FEELING TIRED OR HAVING LITTLE ENERGY: NEARLY EVERY DAY
SUM OF ALL RESPONSES TO PHQ9 QUESTIONS 1 & 2: 2
3. TROUBLE FALLING OR STAYING ASLEEP: MORE THAN HALF THE DAYS

## 2025-02-26 ASSESSMENT — ANXIETY QUESTIONNAIRES
4. TROUBLE RELAXING: SEVERAL DAYS
3. WORRYING TOO MUCH ABOUT DIFFERENT THINGS: SEVERAL DAYS
6. BECOMING EASILY ANNOYED OR IRRITABLE: SEVERAL DAYS
GAD7 TOTAL SCORE: 6
IF YOU CHECKED OFF ANY PROBLEMS ON THIS QUESTIONNAIRE, HOW DIFFICULT HAVE THESE PROBLEMS MADE IT FOR YOU TO DO YOUR WORK, TAKE CARE OF THINGS AT HOME, OR GET ALONG WITH OTHER PEOPLE: SOMEWHAT DIFFICULT
1. FEELING NERVOUS, ANXIOUS, OR ON EDGE: MORE THAN HALF THE DAYS
5. BEING SO RESTLESS THAT IT IS HARD TO SIT STILL: SEVERAL DAYS
2. NOT BEING ABLE TO STOP OR CONTROL WORRYING: NOT AT ALL
7. FEELING AFRAID AS IF SOMETHING AWFUL MIGHT HAPPEN: NOT AT ALL

## 2025-02-26 NOTE — PROGRESS NOTES
Patient has been identified by name and .    Chief Complaint   Patient presents with    Depression     Pt reports to F/U on depression  Bilateral Hip to thigh feels heavy, sore & bruised       Vitals:    25 1049   BP: 116/77   Site: Left Upper Arm   Position: Sitting   Cuff Size: Large Adult   Pulse: 86   Resp: 18   Temp: 98.3 °F (36.8 °C)   TempSrc: Oral   SpO2: 97%   Weight: 92.5 kg (204 lb)   Height: 1.676 m (5' 6\")        \"Have you been to the ER, urgent care clinic since your last visit?  Hospitalized since your last visit?\"    NO    “Have you seen or consulted any other health care providers outside of [facility] since your last visit?”    NO    “Have you had a colorectal cancer screening such as a colonoscopy/FIT/Cologuard?    [facility]    No colonoscopy on file  No cologuard on file  No FIT/FOBT on file   No flexible sigmoidoscopy on file         “Have you had a pap smear?”    NO    No cervical cancer screening on file

## 2025-02-26 NOTE — PATIENT INSTRUCTIONS
[facility] Utility - Financial Resources*  (Call [employer]/211 if need more resources.)  Utilities  [employer]  What they offer: Partnership with the [facility] of . Assist with finding and applying for government funded programs and benefits. You can also update your benefits or report changes through [employer].  Website: https://www.[employer]/  Phone Number: 8335CALLVA (023-123-0139)    [employer]  What they offer: [employer]Share is [facility]'s energy assistance program of last resort for anyone who faces financial hardships from unemployment or family crisis.  Phone Number: [phone]  Address: [address] [place] [address]  Website: https://www.[employer]/[place]/billing/billing-options/energyshare    Energy Assistance Programs (EAP) - [facility] of   What they offer: EAP assists low-income households in meeting their immediate home energy needs.      Website: https://www.[employer].[place].gov/benefit/ea/  Available assistance:   Crisis Assistance - Heating Emergencies  Fuel Assistance - Offset Heating Fuel Costs  Cooling Assistance - Applies to Cooling Utility Bills and Equipment  How to apply:  Online:  https://jobandtalent[place]ANPI/  Call:  243.876.9981   Paper application:   Print application from https://www.[employer].[place].gov/benefit/ea/ and submit to your [facility] Department of     [facility] Department of   [employer] of  contacts: https://www.[facility].[place].[facility]/localagency/index.cgi  [place] Utility Affordability Programs  https://[place].gov/public-utilities/billing  Call:  176.341.8753   Email:  miladis@a.gov  Programs available:  Equal Monthly Payment Plan, [employer] Heat Program, [employer] Water Program, [employer] Water Conservation Program, Senior Assistance, Other Fuel Assistance Programs, PromisePay Payment Plans, Low-Income Household Water

## 2025-02-26 NOTE — PROGRESS NOTES
[facility]  [address] [place] [address]   Office [phone], Fax [phone]      Chief Complaint:     Chief Complaint   Patient presents with    Depression     Pt reports to F/U on depression  Bilateral Hip to thigh feels heavy, sore & bruised       SUBJECTIVE  Hilda Oh is an 49 y.o. female who presents depression follow up    #MDD  - patient was seen virtually on 11/9/24  - lexapro 10mg was started  - she reports about 70-80%. Amenable to increase today  - she was also started on trazodone 50 for sleep maintenance. This has helped. She is taking half a pill.  - she sees her therapist routinely  - ongoing life stressors. Her mother now has heart conditions as well.  - finalizing her divorce    #bilateral leg pain  - bruised feeling  - some difficulty walking  - onset x1 month ago  - worse over the last few weeks  - worse with walking, standing  - this better with rest  - she takes toradol as needed which helped the pain some  - tylenol and ibuprofen have not helped    Allergies   Allergies   Allergen Reactions    Topiramate Swelling     Mouth lips swelling         Medications   Current Outpatient Medications   Medication Sig    escitalopram (LEXAPRO) 20 MG tablet Take 1 tablet by mouth daily    traZODone (DESYREL) 50 MG tablet Take 1 tablet by mouth nightly    tiZANidine (ZANAFLEX) 2 MG tablet take  1 tablet three times a day    acetaminophen (TYLENOL) 500 MG tablet Take 2 tablets by mouth every 6 hours as needed for Pain    NURTEC 75 MG TBDP Take 75 mg by mouth every other day 1 tablet ON THE TONGUE AT ONSET OF HEADACHE every other day if needed    ketorolac (TORADOL) 10 MG tablet Take 1 tablet by mouth every 6 hours as needed for Pain    eletriptan (RELPAX) 40 MG tablet take 1 tablet by mouth if needed AT ONSET OF HEADACHE may repeat in 2 hours IF headache PERSISTS    hydroCHLOROthiazide (HYDRODIURIL) 25 MG tablet Take 1 tablet by mouth daily    celecoxib

## 2025-02-27 LAB
ALBUMIN SERPL-MCNC: 4.1 G/DL (ref 3.5–5)
ALBUMIN/GLOB SERPL: 1.5 (ref 1.1–2.2)
ALP SERPL-CCNC: 120 U/L (ref 45–117)
ALT SERPL-CCNC: 28 U/L (ref 12–78)
ANION GAP SERPL CALC-SCNC: 5 MMOL/L (ref 2–12)
AST SERPL-CCNC: 25 U/L (ref 15–37)
BILIRUB SERPL-MCNC: 0.3 MG/DL (ref 0.2–1)
BUN SERPL-MCNC: 11 MG/DL (ref 6–20)
BUN/CREAT SERPL: 13 (ref 12–20)
CALCIUM SERPL-MCNC: 9.4 MG/DL (ref 8.5–10.1)
CHLORIDE SERPL-SCNC: 107 MMOL/L (ref 97–108)
CK SERPL-CCNC: 163 U/L (ref 26–192)
CO2 SERPL-SCNC: 29 MMOL/L (ref 21–32)
CREAT SERPL-MCNC: 0.82 MG/DL (ref 0.55–1.02)
CRP SERPL-MCNC: 0.82 MG/DL (ref 0–0.3)
ERYTHROCYTE [SEDIMENTATION RATE] IN BLOOD: 9 MM/HR (ref 0–20)
GLOBULIN SER CALC-MCNC: 2.7 G/DL (ref 2–4)
GLUCOSE SERPL-MCNC: 74 MG/DL (ref 65–100)
HCV AB SER IA-ACNC: 0.15 INDEX
HCV AB SERPL QL IA: NONREACTIVE
HIV 1+2 AB+HIV1 P24 AG SERPL QL IA: NONREACTIVE
HIV 1/2 RESULT COMMENT: NORMAL
POTASSIUM SERPL-SCNC: 4 MMOL/L (ref 3.5–5.1)
PROT SERPL-MCNC: 6.8 G/DL (ref 6.4–8.2)
SODIUM SERPL-SCNC: 141 MMOL/L (ref 136–145)

## 2025-02-28 ENCOUNTER — HOSPITAL ENCOUNTER (OUTPATIENT)
Facility: HOSPITAL | Age: 50
Setting detail: RECURRING SERIES
End: 2025-02-28
Payer: COMMERCIAL

## 2025-02-28 PROCEDURE — G0283 ELEC STIM OTHER THAN WOUND: HCPCS

## 2025-02-28 PROCEDURE — 97110 THERAPEUTIC EXERCISES: CPT

## 2025-02-28 PROCEDURE — 97112 NEUROMUSCULAR REEDUCATION: CPT

## 2025-02-28 PROCEDURE — 97140 MANUAL THERAPY 1/> REGIONS: CPT

## 2025-02-28 PROCEDURE — 97535 SELF CARE MNGMENT TRAINING: CPT

## 2025-02-28 NOTE — PROGRESS NOTES
[facility] Residency Attending Attestation: I have seen and evaluated the patient, repeating/performing the critical or key elements of the service. I discussed the findings, assessment and plan with the resident and agree with the resident's documentation.    Roney rGissom MD, MPH  [facility]

## 2025-02-28 NOTE — PROGRESS NOTES
Functional Changes/Progress: reduction in migraine frequency and intensity     Objective/Functional Outcome Measure: Daily Activity  AM-PAC: 29.10% (initial 34.76%)  AM-PAC score = an established functional score where 0% = no disability   Neck Disability Index: 40% impairment    Problem List: pain affecting function, decrease ROM, decrease strength, decrease ADL/functional abilities, decrease activity tolerance, and decrease flexibility/joint mobility   Treatment Plan may include any combination of the followin Therapeutic Exercise, 00625 Neuromuscular Re-Education, 61097 Manual Therapy, 35165 Therapeutic Activity, 49006 Self Care/Home Management, 58095 Electrical Stim unattended /  (MCR), 11265 Ultrasound, and (Elective Self Pay) Needle Insertion w/o Injection (1 or 2 muscles), (3+ muscles)  Patient Goal(s) has been updated and includes: \"be able to maintain my progress on my own\"    Goals for this certification period include and are to be achieved in   6  treatments:    Pt will improve Neck Disability Index by 5-10%.  Pt will be compliant with use of TENS/heat/self-massage techniques to help manage pain independently and reduce migraine symptoms.    Frequency / Duration:   Patient to be seen   2   times per month for   6    treatments:      Assessments/Recommendations for Continuation of Care: Pt has completed 21 PT sessions, with treatments focusing on postural correction, ann-scapular strengthening, manual therapy to reduce soft tissue tightness, dry needling, and stretches/ROM. Pt has been compliant with her HEP and is highly motivated to improve. She has demonstrated good benefit from both myofascial release/suboccipital release and dry needling with decrease in reported frequency and duration of migraines. AmPAC score improved by 5% indicating improved overall function with ADLs. Barriers to progress include R shoulder impingement/pain, and generalized stress contributing to tension and  migraine symptoms. She does continue with migraines averaging twice weekly. We discussed benefits of TENS units and pt trialed the Zynex device to allow use of interferential current at home. She reports good benefit today s/p e-stim with no pain reported. Feel a home e-stim unit will be beneficial to the pt as she transitions to self-care/HEP. Plan to continue with decreased frequency to 2x/monthly with pt transitioning to self care and independent exercise. Will focus sessions on manual therapy and dry needling as pt demos knowledge and compliance with HEP.     Patient will continue to benefit from skilled PT / OT services to modify and progress therapeutic interventions, analyze and address soft tissue restrictions, analyze and cue for proper movement patterns, and analyze and modify for postural abnormalities to address functional deficits and attain remaining goals.    If you have any questions/comments please contact us directly at [phone].    Thank you for allowing us to assist in the care of your patient.        Kimberly Miller, PT       2/28/2025       12:53 PM      ___ I have read the above report and request that my patient continue as recommended.   ___ I have read the above report and request that my patient continue therapy with the following changes/special instructions: ________________________________________________   ___ I have read the above report and request that my patient be discharged from therapy.     Physician's Signature:_________________________   DATE:_________   TIME:________                           Roddy Hunt Jr.,*    ** Signature, Date and Time must be completed for valid certification **  Please sign and fax to [phone].  Thank you

## 2025-02-28 NOTE — THERAPY RECERTIFICATION
[facility] [address], [address]  [place] [address]  Ph: [phone]     Fax: [phone]     CONTINUED PLAN OF CARE/RECERTIFICATION FOR PHYSICAL THERAPY                   Patient Name:              Hilda Oh :  1975   Treatment/Medical Diagnosis:  Bilateral occipital neuralgia [M54.81]  Cervicogenic headache [G44.86]   Onset Date:  chronic     Referral Source:  Roddy Hunt Jr.,* Start of Care (SOC):  10/15/24   Prior Hospitalization:  See Medical History Provider #:  NPI      Prior Level of Function (PLOF):  independent   Comorbidities:  See evaluation   Medications:  Verified on Patient Summary List   Visits from SOC:  21 Missed Visits:  1       Progress toward Goals:  Short Term Goals: To be accomplished in 8 treatments.  Pt will report compliance to a progressive HEP to assist in rehab progression.              [x] Met [] Progressing [] Not Met  Date: 24 pt demonstrates knowledge of technique with exercises  Pt will demonstrate improved postural awareness in sitting and standing and will utilize techniques to correct posture.              [x] Met [] Progressing [] Not Met  Date: 24  Pt will report decrease in frequency and intensity of migraine headaches by 25%.              [] Met [x] Progressing [] Not Met  Date: 24 less frequent but not less intense  Long Term Goals: To be accomplished in 15 treatments.  Pt will report improved AMPAC score by 10% indicating improved function.              [] Met [x] Progressing [] Not Met  Date: 25% improved, pt reports impairments more limited due to RA in hands  Pt will report decrease in frequency of migraine headaches to average of 1x/week.              [] Met [x] Progressing [] Not Met  Date: 25 average of 2x/week but none this week  3.   Pt will report decrease in duration of migraine headache symptoms by 50%.              [x] Met [] Progressing [] Not Met  Date:

## 2025-02-28 NOTE — PROGRESS NOTES
PHYSICAL THERAPY - DAILY TREATMENT NOTE (updated 3/23)      Date: 2025          Patient Name:  Hilda Oh :  1975   Medical   Diagnosis:  Bilateral occipital neuralgia [M54.81]  Cervicogenic headache [G44.86] Treatment Diagnosis:  M54.2, G89.29  CHRONIC NECK PAIN    Referral Source:  Roddy Hunt Jr.,* Insurance:   Payor: [facility]BS / Plan: [facility] [place] / Product Type: *No Product type* /       Patient  verified yes     Visit #   Current  / Total 21 22   Time   In / Out 800am 908am   Total Treatment Time 65   Total Timed Codes 55   1:1 Treatment Time 55      [facility] Totals Reminder:  bill using total billable   min of TIMED therapeutic procedures and modalities.   8-22 min = 1 unit; 23-37 min = 2 units; 38-52 min = 3 units; 53-67 min = 4 units; 68-82 min = 5 units          SUBJECTIVE    Pain Level (0-10 scale): 1/10    Any medication changes, allergies to medications, adverse drug reactions, diagnosis change, or new procedure performed?: [x] No    [] Yes (see summary sheet for update)  Medications: Verified on Patient Summary List    Subjective functional status/changes:     Pt missed last week due to weather.  She reports she had more headaches last week likely due to activities she is doing. This week her headaches have been manageable.  She is still interested in the Zynex device.    OBJECTIVE      Therapeutic Procedures:  Tx Min Billable or 1:1 Min (if diff from Tx Min) Procedure, Rationale, Specifics   75 14408 Therapeutic Exercise (timed):  increase ROM, strength, coordination, balance, and proprioception to improve patient's ability to progress to PLOF and address remaining functional goals. (see flow sheet as applicable)     Details if applicable:  progress report, exercises   15  24842 Manual Therapy (timed):  increase tissue extensibility and decrease trigger points to improve patient's ability to progress to PLOF and address remaining functional goals.  The manual therapy

## 2025-03-01 LAB — ANA SER QL: NEGATIVE

## 2025-03-10 ENCOUNTER — TELEPHONE (OUTPATIENT)
Age: 50
End: 2025-03-10

## 2025-03-10 NOTE — TELEPHONE ENCOUNTER
Patient at hospital for her daughter who fell. Requesting a call back at another time to discuss lab results.    Will try back another time.    Alk Phos persistently elevated will obtain GTT at next visit. If elevated, will obtain RUQ US.    CRP minimally elevated. Low suspicion for autoimmune cause of b/l LE myalgia given normal ESR and YAMILE. Possible fibromyalgia.    Aayush Osborn MD

## 2025-03-12 ENCOUNTER — RESULTS FOLLOW-UP (OUTPATIENT)
Age: 50
End: 2025-03-12

## 2025-03-14 ENCOUNTER — HOSPITAL ENCOUNTER (OUTPATIENT)
Facility: HOSPITAL | Age: 50
Setting detail: RECURRING SERIES
Discharge: HOME | End: 2025-03-17
Payer: COMMERCIAL

## 2025-03-14 PROCEDURE — 97110 THERAPEUTIC EXERCISES: CPT

## 2025-03-14 PROCEDURE — 20560 NDL INSJ W/O NJX 1 OR 2 MUSC: CPT

## 2025-03-14 PROCEDURE — 97140 MANUAL THERAPY 1/> REGIONS: CPT

## 2025-03-14 NOTE — PROGRESS NOTES
report improved AMPAC score by 10% indicating improved function.   [] Met [x] Progressing [] Not Met  Date: 2/28/25% improved, pt reports impairments more limited due to RA in hands  Pt will report decrease in frequency of migraine headaches to average of 1x/week.   [] Met [x] Progressing [] Not Met  Date: 2/28/25 average of 2x/week but none this week  3.   Pt will report decrease in duration of migraine headache symptoms by 50%.   [x] Met [] Progressing [] Not Met  Date: 2/28/25    New Goals: To be accomplished in 6 treatments.  Pt will improve Neck Disability Index by 5-10%.  Pt will be compliant with use of TENS/heat/self-massage techniques to help manage pain independently and reduce migraine symptoms.    PLAN  Yes  Continue plan of care  Re-Cert Due: after 23 visits  [x]  Upgrade activities as tolerated  []  Discharge due to:  [x]  Other: follow Gokul Miller, PT       3/14/2025       8:06 AM

## 2025-03-20 ENCOUNTER — OFFICE VISIT (OUTPATIENT)
Age: 50
End: 2025-03-20
Payer: COMMERCIAL

## 2025-03-20 VITALS
SYSTOLIC BLOOD PRESSURE: 126 MMHG | HEART RATE: 89 BPM | TEMPERATURE: 97.9 F | OXYGEN SATURATION: 98 % | DIASTOLIC BLOOD PRESSURE: 74 MMHG | WEIGHT: 200 LBS | BODY MASS INDEX: 32.14 KG/M2 | HEIGHT: 66 IN | RESPIRATION RATE: 16 BRPM

## 2025-03-20 DIAGNOSIS — R20.0 NUMBNESS AND TINGLING IN BOTH HANDS: ICD-10-CM

## 2025-03-20 DIAGNOSIS — R20.2 NUMBNESS AND TINGLING IN BOTH HANDS: ICD-10-CM

## 2025-03-20 DIAGNOSIS — G44.86 CERVICOGENIC HEADACHE: ICD-10-CM

## 2025-03-20 DIAGNOSIS — M54.81 BILATERAL OCCIPITAL NEURALGIA: Primary | ICD-10-CM

## 2025-03-20 DIAGNOSIS — R20.2 NUMBNESS AND TINGLING OF BOTH FEET: ICD-10-CM

## 2025-03-20 DIAGNOSIS — G43.709 CHRONIC MIGRAINE W/O AURA, NOT INTRACTABLE, W/O STAT MIGR: ICD-10-CM

## 2025-03-20 DIAGNOSIS — R20.0 NUMBNESS AND TINGLING OF BOTH FEET: ICD-10-CM

## 2025-03-20 PROCEDURE — 99214 OFFICE O/P EST MOD 30 MIN: CPT | Performed by: PSYCHIATRY & NEUROLOGY

## 2025-03-20 RX ORDER — PANTOPRAZOLE SODIUM 40 MG/1
TABLET, DELAYED RELEASE ORAL
COMMUNITY
Start: 2025-02-17

## 2025-03-20 RX ORDER — TIZANIDINE 2 MG/1
TABLET ORAL
Qty: 90 TABLET | Refills: 5 | Status: SHIPPED | OUTPATIENT
Start: 2025-03-20

## 2025-03-20 RX ORDER — KETOROLAC TROMETHAMINE 10 MG/1
10 TABLET, FILM COATED ORAL EVERY 6 HOURS PRN
Qty: 20 TABLET | Refills: 0 | Status: SHIPPED | OUTPATIENT
Start: 2025-03-20

## 2025-03-20 RX ORDER — RIMEGEPANT SULFATE 75 MG/75MG
75 TABLET, ORALLY DISINTEGRATING ORAL EVERY OTHER DAY
Qty: 16 TABLET | Refills: 5 | Status: SHIPPED | OUTPATIENT
Start: 2025-03-20

## 2025-03-20 ASSESSMENT — PATIENT HEALTH QUESTIONNAIRE - PHQ9
1. LITTLE INTEREST OR PLEASURE IN DOING THINGS: NOT AT ALL
SUM OF ALL RESPONSES TO PHQ QUESTIONS 1-9: 1
2. FEELING DOWN, DEPRESSED OR HOPELESS: SEVERAL DAYS
SUM OF ALL RESPONSES TO PHQ QUESTIONS 1-9: 1

## 2025-03-20 NOTE — PROGRESS NOTES
NEUROLOGY CLINIC NOTE    Patient ID:  Hilda Oh  [id number]  49 y.o.  1975    Date of Visit:  2025    Reason for Visit:  headache    Chief Complaint   Patient presents with    Headache     6 month follow up- patient reports she has had 2 headaches a week in the last 30 days.    Neck Pain     6 month follow up- patient reports neck pain has slightly improved.   Dry needling has helped but only has 2 more visits.       History of Present Illness:     Patient Active Problem List    Diagnosis Date Noted    Migraines 2024     Past Medical History:   Diagnosis Date    Arthritis     Migraines     imitrex orally and injections    Nausea & vomiting       Past Surgical History:   Procedure Laterality Date     SECTION      times 4 pain at epidural site continues    OTHER SURGICAL HISTORY      remove scar tissue after c section    TONSILLECTOMY AND ADENOIDECTOMY      age 23    WISDOM TOOTH EXTRACTION        Current Outpatient Medications on File Prior to Visit   Medication Sig Dispense Refill    pantoprazole (PROTONIX) 40 MG tablet take 1 tablet by mouth 30 MINUTES before breakfast FOR REFLUX SYMPTOMS      escitalopram (LEXAPRO) 20 MG tablet Take 1 tablet by mouth daily 90 tablet 2    traZODone (DESYREL) 50 MG tablet Take 1 tablet by mouth nightly 90 tablet 0    tiZANidine (ZANAFLEX) 2 MG tablet take  1 tablet three times a day (Patient taking differently: Take 2 tablets by mouth at bedtime) 90 tablet 2    acetaminophen (TYLENOL) 500 MG tablet Take 2 tablets by mouth every 6 hours as needed for Pain      NURTEC 75 MG TBDP Take 75 mg by mouth every other day 1 tablet ON THE TONGUE AT ONSET OF HEADACHE every other day if needed (Patient taking differently: Take 75 mg by mouth every other day) 16 tablet 5    ketorolac (TORADOL) 10 MG tablet Take 1 tablet by mouth every 6 hours as needed for Pain 20 tablet 0    eletriptan (RELPAX) 40 MG tablet take 1 tablet by mouth if needed AT ONSET OF HEADACHE

## 2025-03-24 ENCOUNTER — PROCEDURE VISIT (OUTPATIENT)
Age: 50
End: 2025-03-24
Payer: COMMERCIAL

## 2025-03-24 DIAGNOSIS — R20.0 NUMBNESS AND TINGLING IN BOTH HANDS: Primary | ICD-10-CM

## 2025-03-24 DIAGNOSIS — R20.2 NUMBNESS AND TINGLING IN BOTH HANDS: Primary | ICD-10-CM

## 2025-03-24 PROCEDURE — 95886 MUSC TEST DONE W/N TEST COMP: CPT | Performed by: PSYCHIATRY & NEUROLOGY

## 2025-03-24 PROCEDURE — 95913 NRV CNDJ TEST 13/> STUDIES: CPT | Performed by: PSYCHIATRY & NEUROLOGY

## 2025-03-24 NOTE — PROGRESS NOTES
EMG/ NCS Report  [facility] [address], [address]  [place]  [address]   Ph: [phone]   FAX: [phone]/ [phone]    Test Date:  3/24/2025    Patient: RADHA ANTONY : 1975 Physician: Roddy Hunt MD   ID#: [id number] SEX: Female Ref. Phys: Roddy Hunt MD   Tech: Velvet Mclaughlin    Patient History / Exam:  Patient is complaining of intermittent hand numbness and tingling with dropping things for the past several weeks. (+) Tinel's bilateral wrist. Assess for neuropathy vs cervical radiculopathy.     EMG & NCV Findings:  Sensory and motor nerve conduction studies (as indicated in the tables) were within reference of normal.    All F Wave latencies were within normal limits.  All F Wave left vs. right side latency differences were within normal limits.      Disposable concentric needle EMG (as indicated in the table) showed no evidence of electrical instability.      Impressions:   This study is normal.  There is no electrodiagnostic evidence of an entrapment neuropathy, generalized neuropathy, myopathy or significant cervical radiculopathy at this time.    Roddy Hunt MD    Nerve Conduction Studies  Anti Sensory Summary Table     Stim Site NR Peak (ms) Norm Peak (ms) P-T Amp (µV) Norm P-T Amp Site1 Site2 Dist (cm)   Left Median Anti Sensory (2nd Digit)  30.2 °C   Wrist    2.8 <4 34.2 >13 Wrist 2nd Digit 14.0   Elbow    2.8  39.3  Elbow Wrist 0.0   Right Median Anti Sensory (2nd Digit)  30.2 °C   Wrist    2.8 <4 71.6 >13 Wrist 2nd Digit 14.0   Elbow    2.8  64.8  Elbow Wrist 0.0   Left Radial Anti Sensory (Base 1st Digit)  31.3 °C   Wrist    1.9 <2.8 80.1 >11 Wrist Base 1st Digit 10.0   Right Radial Anti Sensory (Base 1st Digit)  31.1 °C   Wrist    1.9 <2.8 90.3 >11 Wrist Base 1st Digit 10.0   Left Ulnar Anti Sensory (5th Digit)  30.9 °C   Wrist    2.8 <4.0 23.7 >9 Wrist 5th Digit 14.0   B Elbow    2.8  41.6  B Elbow Wrist 0.0

## 2025-03-26 ENCOUNTER — OFFICE VISIT (OUTPATIENT)
Age: 50
End: 2025-03-26
Payer: COMMERCIAL

## 2025-03-26 VITALS
DIASTOLIC BLOOD PRESSURE: 79 MMHG | WEIGHT: 204.5 LBS | BODY MASS INDEX: 32.87 KG/M2 | SYSTOLIC BLOOD PRESSURE: 125 MMHG | OXYGEN SATURATION: 97 % | HEART RATE: 75 BPM | HEIGHT: 66 IN

## 2025-03-26 DIAGNOSIS — Z01.419 WELL WOMAN EXAM WITH ROUTINE GYNECOLOGICAL EXAM: Primary | ICD-10-CM

## 2025-03-26 DIAGNOSIS — L73.8 FOLLICULITIS BARBAE: ICD-10-CM

## 2025-03-26 DIAGNOSIS — Z12.31 BREAST CANCER SCREENING BY MAMMOGRAM: ICD-10-CM

## 2025-03-26 PROCEDURE — 99396 PREV VISIT EST AGE 40-64: CPT | Performed by: NURSE PRACTITIONER

## 2025-03-26 NOTE — PROGRESS NOTES
Annual exam ages 40-64      Hilda Oh is a ,  49 y.o. female   No LMP recorded. (Menstrual status: Irregular periods).    She presents for her annual checkup.     She is having problems - vulvar x2, <1 cm  lesion, papule,     Menstrual status:    Her periods are perimenopausal LMP 2024     Contraception:    The current method of family planning is abstinence.    Hormonal status:  She reports no perimenstrual type symptoms.   She is not having vasomotor symptoms.  The patient is not using any ERT.    Sexual history:    She  reports that she is not currently sexually active.    Medical conditions:    Since her last annual GYN exam about three or more years ago, she has not the following changes in her health history: none.     Surgical history confirmed with patient.  has a past surgical history that includes  section; Tonsillectomy and adenoidectomy; [place] tooth extraction; and other surgical history.    Pap and Mammogram History:    Her most recent Pap smear was normal, obtained 3 year(s) ago.    The patient had a recent mammogram 2024 which was negative for malignancy.    Breast Cancer History/Substance Abuse: negative      Past Medical History:   Diagnosis Date    Arthritis     Migraines     imitrex orally and injections    Nausea & vomiting      Past Surgical History:   Procedure Laterality Date     SECTION      times 4 pain at epidural site continues    OTHER SURGICAL HISTORY      remove scar tissue after c section    TONSILLECTOMY AND ADENOIDECTOMY      age 23    WISDOM TOOTH EXTRACTION         Current Outpatient Medications   Medication Sig Dispense Refill    pantoprazole (PROTONIX) 40 MG tablet take 1 tablet by mouth 30 MINUTES before breakfast FOR REFLUX SYMPTOMS      ketorolac (TORADOL) 10 MG tablet Take 1 tablet by mouth every 6 hours as needed for Pain 20 tablet 0    NURTEC 75 MG TBDP Take 75 mg by mouth every other day 16 tablet 5    tiZANidine (ZANAFLEX) 2 MG tablet

## 2025-03-26 NOTE — PROGRESS NOTES
\"Have you been to the ER, urgent care clinic since your last visit?  Hospitalized since your last visit?\"    NO    “Have you seen or consulted any other health care providers outside our system since your last visit?”    NO     “Have you had a pap smear?”    NO          “Have you had a colorectal cancer screening such as a colonoscopy/FIT/Cologuard?    YES - Type: Colonoscopy

## 2025-03-28 ENCOUNTER — HOSPITAL ENCOUNTER (OUTPATIENT)
Facility: HOSPITAL | Age: 50
Setting detail: RECURRING SERIES
Discharge: HOME | End: 2025-03-31
Payer: COMMERCIAL

## 2025-03-28 PROCEDURE — 97140 MANUAL THERAPY 1/> REGIONS: CPT

## 2025-03-28 PROCEDURE — 20560 NDL INSJ W/O NJX 1 OR 2 MUSC: CPT

## 2025-03-28 PROCEDURE — 97110 THERAPEUTIC EXERCISES: CPT

## 2025-03-28 NOTE — PROGRESS NOTES
PHYSICAL THERAPY - DAILY TREATMENT NOTE (updated 3/23)      Date: 3/28/2025          Patient Name:  Hilda Oh :  1975   Medical   Diagnosis:  Bilateral occipital neuralgia [M54.81]  Cervicogenic headache [G44.86] Treatment Diagnosis:  M54.2, G89.29  CHRONIC NECK PAIN    Referral Source:  Roddy Hunt Jr.,* [facility]:   [facility]: [facility] / Product Type: *No Product type* /       Patient  verified yes     Visit #   Current  / Total 23 26   Time   In / Out 805am 900am   Total Treatment Time 48   Total Timed Codes 28   1:1 Treatment Time 28      [facility] Totals Reminder:  bill using total billable   min of TIMED therapeutic procedures and modalities.   8-22 min = 1 unit; 23-37 min = 2 units; 38-52 min = 3 units; 53-67 min = 4 units; 68-82 min = 5 units          SUBJECTIVE    Pain Level (0-10 scale): 3/10    Any medication changes, allergies to medications, adverse drug reactions, diagnosis change, or new procedure performed?: [x] No    [] Yes (see summary sheet for update)  Medications: Verified on Patient Summary List    Subjective functional status/changes:     Pt had testing for her arms or legs for her nerve testing. States everything came back good. Her neurologist said he would continue to sign off on as many dry needling sessions as she needs.   Currently not having too much pain but states her neck feels very stiff. She also gets a spasm on her R UT at times, especially while driving.    OBJECTIVE      Therapeutic Procedures:  Tx Min Billable or 1:1 Min (if diff from Tx Min) Procedure, Rationale, Specifics   8  16823 Therapeutic Exercise (timed):  increase ROM, strength, coordination, balance, and proprioception to improve patient's ability to progress to PLOF and address remaining functional goals. (see flow sheet as applicable)     Details if applicable:  exercises and review HEP   20  70063 Manual Therapy (timed):  increase tissue extensibility and decrease trigger

## 2025-03-31 LAB
., LABCORP: NORMAL
C TRACH RRNA CVX QL NAA+PROBE: NEGATIVE
CYTOLOGIST CVX/VAG CYTO: NORMAL
CYTOLOGY CVX/VAG DOC CYTO: NORMAL
CYTOLOGY CVX/VAG DOC THIN PREP: NORMAL
DX ICD CODE: NORMAL
N GONORRHOEA RRNA CVX QL NAA+PROBE: NEGATIVE
OTHER STN SPEC: NORMAL
SERVICE CMNT-IMP: NORMAL
STAT OF ADQ CVX/VAG CYTO-IMP: NORMAL
T VAGINALIS RRNA SPEC QL NAA+PROBE: NEGATIVE

## 2025-04-01 ENCOUNTER — RESULTS FOLLOW-UP (OUTPATIENT)
Age: 50
End: 2025-04-01

## 2025-04-01 ENCOUNTER — PROCEDURE VISIT (OUTPATIENT)
Age: 50
End: 2025-04-01
Payer: COMMERCIAL

## 2025-04-01 DIAGNOSIS — R20.0 NUMBNESS AND TINGLING OF BOTH FEET: Primary | ICD-10-CM

## 2025-04-01 DIAGNOSIS — R20.2 NUMBNESS AND TINGLING OF BOTH FEET: Primary | ICD-10-CM

## 2025-04-01 PROCEDURE — 95911 NRV CNDJ TEST 9-10 STUDIES: CPT | Performed by: PSYCHIATRY & NEUROLOGY

## 2025-04-01 PROCEDURE — 95886 MUSC TEST DONE W/N TEST COMP: CPT | Performed by: PSYCHIATRY & NEUROLOGY

## 2025-04-01 NOTE — PROGRESS NOTES
EMG/ NCS Report  [facility] [address], [address]  [place]  [address]   Ph: [phone]   FAX: [phone]/ [phone]    Test Date:  2025    Patient: RADHA ANTONY : 1975 Physician: Roddy Hunt MD   ID#: [id number] SEX: Female Ref. Phys: Roddy Hunt MD   Tech: Velvet Mclaughlin    Patient History / Exam:  Patient complaining of bilateral leg paresthesia and bone pain. Assess for neuropathy.     EMG & NCV Findings:  Sensory and motor nerve conduction studies (as indicated in the tables) were within reference of normal.      All F Wave latencies were within normal limits.  All F Wave left vs. right side latency differences were within normal limits.  All H Reflex left vs. right side latency differences were within normal limits.      Disposable concentric needle EMG (as indicated in the table) showed no evidence of electrical instability.      Impressions:   This study is normal.  There is no electrodiagnostic evidence of a generalized large myelinated fiber polyneuropathy, myopathy or limb involvement lumbar radiculopathy at this time.     Roddy Hunt MD    Nerve Conduction Studies  Anti Sensory Summary Table     Stim Site NR Peak (ms) Norm Peak (ms) P-T Amp (µV) Norm P-T Amp Site1 Site2 Dist (cm)   Left Sup Fibular Anti Sensory (Lat ankle)  32.8 °C   Lower leg    2.9 <4.5 20.6 >5 Lower leg Lat ankle 10.0   Site 2    2.8  23.6       Right Sup Fibular Anti Sensory (Lat ankle)  32.7 °C   Lower leg    2.8 <4.5 24.8 >5 Lower leg Lat ankle 10.0   Site 2    2.8  22.7       Left Sural Anti Sensory (Lat Mall)  31.8 °C   Calf    3.5 <4.5 15.2 >4.0 Calf Lat Mall 14.0   Site 2    3.5  15.0       Right Sural Anti Sensory (Lat Mall)  32 °C   Calf    3.6 <4.5 13.1 >4.0 Calf Lat Mall 14.0   Site 2    3.6  15.9         Motor Summary Table     Stim Site NR Onset (ms) Norm Onset (ms) O-P Amp (mV) Norm O-P Amp Amp (Prev) (%) Site1 Site2 Dist (cm)

## 2025-04-03 ENCOUNTER — PATIENT MESSAGE (OUTPATIENT)
Age: 50
End: 2025-04-03

## 2025-04-03 DIAGNOSIS — L73.8 FOLLICULITIS BARBAE: Primary | ICD-10-CM

## 2025-04-03 DIAGNOSIS — G47.33 OSA (OBSTRUCTIVE SLEEP APNEA): Primary | ICD-10-CM

## 2025-04-04 ENCOUNTER — TELEPHONE (OUTPATIENT)
Age: 50
End: 2025-04-04

## 2025-04-04 RX ORDER — CEPHALEXIN 500 MG/1
500 CAPSULE ORAL 3 TIMES DAILY
Qty: 21 CAPSULE | Refills: 0 | Status: SHIPPED | OUTPATIENT
Start: 2025-04-04 | End: 2025-04-11

## 2025-04-04 NOTE — TELEPHONE ENCOUNTER
Pt called and requesting an antibiotic to be sent to her Pharmacy. Pt stated that she saw NELDA Guo on 4/1/25 and she told her that she has boils in her vagina area; pt stated that the boils are now bleeding and she's having night sweats. Pt asking if a Nurse can give her a call back. Contact number [phone].[facility]

## 2025-04-11 ENCOUNTER — HOSPITAL ENCOUNTER (OUTPATIENT)
Facility: HOSPITAL | Age: 50
Setting detail: RECURRING SERIES
Discharge: HOME | End: 2025-04-14
Payer: COMMERCIAL

## 2025-04-11 PROCEDURE — 97140 MANUAL THERAPY 1/> REGIONS: CPT

## 2025-04-11 PROCEDURE — 97110 THERAPEUTIC EXERCISES: CPT

## 2025-04-11 NOTE — PROGRESS NOTES
PHYSICAL THERAPY - DAILY TREATMENT NOTE (updated 3/23)      Date: 2025          Patient Name:  Hilda Oh :  1975   Medical   Diagnosis:  Bilateral occipital neuralgia [M54.81]  Cervicogenic headache [G44.86] Treatment Diagnosis:  M54.2, G89.29  CHRONIC NECK PAIN    Referral Source:  Roddy Hunt Jr.,* Insurance:   [facility] / [facility]: [facility] / Product Type: *No Product type* /       Patient  verified yes     Visit #   Current  / Total 24 26   Time   In / Out 803am 900am   Total Treatment Time 56   Total Timed Codes 46   1:1 Treatment Time 46       BC Totals Reminder:  bill using total billable   min of TIMED therapeutic procedures and modalities.   8-22 min = 1 unit; 23-37 min = 2 units; 38-52 min = 3 units; 53-67 min = 4 units; 68-82 min = 5 units          SUBJECTIVE    Pain Level (0-10 scale): 3/10    Any medication changes, allergies to medications, adverse drug reactions, diagnosis change, or new procedure performed?: [x] No    [] Yes (see summary sheet for update)  Medications: Verified on Patient Summary List    Subjective functional status/changes:     Pt states she started taking 1/2 dose of muscle relaxer this week and has not had as many migraines in the last week or two.     OBJECTIVE      Therapeutic Procedures:  Tx Min Billable or 1:1 Min (if diff from Tx Min) Procedure, Rationale, Specifics   26  68536 Therapeutic Exercise (timed):  increase ROM, strength, coordination, balance, and proprioception to improve patient's ability to progress to PLOF and address remaining functional goals. (see flow sheet as applicable)     Details if applicable:  exercises and review HEP   20  76522 Manual Therapy (timed):  increase tissue extensibility and decrease trigger points to improve patient's ability to progress to PLOF and address remaining functional goals.  The manual therapy interventions were performed at a separate and distinct time from the therapeutic activities

## 2025-04-11 NOTE — PROGRESS NOTES
[facility] [address], [address]  [place] [address]  Ph: [phone]     Fax: [phone]    PHYSICAL THERAPY PROGRESS NOTE  Patient Name:  Hilda Oh :  1975   Treatment/Medical Diagnosis: Bilateral occipital neuralgia [M54.81]  Cervicogenic headache [G44.86]   Referral Source:  Roddy Hunt Jr.,*     Date of Initial Visit:  10/15/24 Attended Visits:  22 Missed Visits:  1     SUMMARY OF TREATMENT/ASSESSMENT:  Pt continues to benefit from skilled PT services. She reports decrease in frequency of migraines and feels she is managing them better. Continues to report compliance with her HEP, but has not obtained a TENS unit yet. We continue to utilize dry needling, myofascial release, and postural correction exercises in therapy. Instruction provided for progression of stretches and strengthening as tolerated.   Patient will continue to benefit from skilled PT / OT services to modify and progress therapeutic interventions, analyze and address soft tissue restrictions, analyze and cue for proper movement patterns, and analyze and modify for postural abnormalities to address functional deficits and attain remaining goals.    CURRENT STATUS/GOALS:  Short Term Goals: To be accomplished in 8 treatments.  Pt will report compliance to a progressive HEP to assist in rehab progression.              [x] Met [] Progressing [] Not Met  Date: 24 pt demonstrates knowledge of technique with exercises  Pt will demonstrate improved postural awareness in sitting and standing and will utilize techniques to correct posture.              [x] Met [] Progressing [] Not Met  Date: 24  Pt will report decrease in frequency and intensity of migraine headaches by 25%.              [] Met [x] Progressing [] Not Met  Date: 24 less frequent but not less intense  Long Term Goals: To be accomplished in 15 treatments.  Pt will report improved AMPAC score by 10%

## 2025-04-25 ENCOUNTER — HOSPITAL ENCOUNTER (OUTPATIENT)
Facility: HOSPITAL | Age: 50
Setting detail: RECURRING SERIES
Discharge: HOME | End: 2025-04-28
Payer: COMMERCIAL

## 2025-04-25 PROCEDURE — 20561 NDL INSJ W/O NJX 3+ MUSC: CPT

## 2025-04-25 PROCEDURE — 97140 MANUAL THERAPY 1/> REGIONS: CPT

## 2025-04-25 NOTE — PROGRESS NOTES
PHYSICAL THERAPY - DAILY TREATMENT NOTE (updated 3/23)      Date: 2025          Patient Name:  Hilda Oh :  1975   Medical   Diagnosis:  Bilateral occipital neuralgia [M54.81]  Cervicogenic headache [G44.86] Treatment Diagnosis:  M54.2, G89.29  CHRONIC NECK PAIN    Referral Source:  Roddy Hunt Jr.,* Insurance:   [facility] / [facility]: [facility] [place] / Product Type: *No Product type* /       Patient  verified yes     Visit #   Current  / Total 25 26   Time   In / Out 758am 857am   Total Treatment Time 58   Total Timed Codes 38   1:1 Treatment Time 38      [facility] Totals Reminder:  bill using total billable   min of TIMED therapeutic procedures and modalities.   8-22 min = 1 unit; 23-37 min = 2 units; 38-52 min = 3 units; 53-67 min = 4 units; 68-82 min = 5 units          SUBJECTIVE    Pain Level (0-10 scale): 3/10    Any medication changes, allergies to medications, adverse drug reactions, diagnosis change, or new procedure performed?: [x] No    [] Yes (see summary sheet for update)  Medications: Verified on Patient Summary List    Subjective functional status/changes:     Pt reports she had increased pain and migraines for a few days after she did exercises here last session.  She has still been having a lot of stress with her personal life which is probably contributing.    OBJECTIVE      Therapeutic Procedures:  Tx Min Billable or 1:1 Min (if diff from Tx Min) Procedure, Rationale, Specifics     36067 Therapeutic Exercise (timed):  increase ROM, strength, coordination, balance, and proprioception to improve patient's ability to progress to PLOF and address remaining functional goals. (see flow sheet as applicable)     Details if applicable:  exercises and review HEP   38  15063 Manual Therapy (timed):  increase tissue extensibility and decrease trigger points to improve patient's ability to progress to PLOF and address remaining functional goals.  The manual therapy interventions were

## 2025-05-08 ENCOUNTER — HOSPITAL ENCOUNTER (OUTPATIENT)
Facility: HOSPITAL | Age: 50
Setting detail: RECURRING SERIES
Discharge: HOME | End: 2025-05-11
Payer: COMMERCIAL

## 2025-05-08 PROCEDURE — 97110 THERAPEUTIC EXERCISES: CPT

## 2025-05-08 PROCEDURE — 97140 MANUAL THERAPY 1/> REGIONS: CPT

## 2025-05-08 NOTE — PROGRESS NOTES
PHYSICAL THERAPY - DAILY TREATMENT NOTE (updated 3/23)      Date: 2025          Patient Name:  Hilda Oh :  1975   Medical   Diagnosis:  Bilateral occipital neuralgia [M54.81]  Cervicogenic headache [G44.86] Treatment Diagnosis:  M54.2, G89.29  CHRONIC NECK PAIN    Referral Source:  Roddy Hunt Jr.,* Insurance:   [facility]: [facility] / Product Type: *No Product type* /       Patient  verified yes     Visit #   Current  / Total 26 26   Time   In / Out 813am 900am   Total Treatment Time 47   Total Timed Codes 47   1:1 Treatment Time 47       [facility] Totals Reminder:  bill using total billable   min of TIMED therapeutic procedures and modalities.   8-22 min = 1 unit; 23-37 min = 2 units; 38-52 min = 3 units; 53-67 min = 4 units; 68-82 min = 5 units          SUBJECTIVE    Pain Level (0-10 scale): 3/10    Any medication changes, allergies to medications, adverse drug reactions, diagnosis change, or new procedure performed?: [x] No    [] Yes (see summary sheet for update)  Medications: Verified on Patient Summary List    Subjective functional status/changes:     Pt overall doing better this week. Still feels tighter through her neck and shoulders. Worked two jobs yesterday.  States she has not been able to do her exercises as much.    OBJECTIVE      Therapeutic Procedures:  Tx Min Billable or 1:1 Min (if diff from Tx Min) Procedure, Rationale, Specifics   12  17043 Therapeutic Exercise (timed):  increase ROM, strength, coordination, balance, and proprioception to improve patient's ability to progress to PLOF and address remaining functional goals. (see flow sheet as applicable)     Details if applicable:  exercises and review HEP   35  71361 Manual Therapy (timed):  increase tissue extensibility and decrease trigger points to improve patient's ability to progress to PLOF and address remaining functional goals.  The manual therapy interventions were performed at a separate and

## 2025-05-22 ENCOUNTER — OFFICE VISIT (OUTPATIENT)
Age: 50
End: 2025-05-22
Payer: COMMERCIAL

## 2025-05-22 VITALS
SYSTOLIC BLOOD PRESSURE: 99 MMHG | DIASTOLIC BLOOD PRESSURE: 68 MMHG | TEMPERATURE: 98.3 F | HEIGHT: 66 IN | BODY MASS INDEX: 33.43 KG/M2 | WEIGHT: 208 LBS | HEART RATE: 88 BPM | OXYGEN SATURATION: 98 %

## 2025-05-22 DIAGNOSIS — F33.41 RECURRENT MAJOR DEPRESSIVE DISORDER, IN PARTIAL REMISSION: ICD-10-CM

## 2025-05-22 DIAGNOSIS — R74.8 ELEVATED ALKALINE PHOSPHATASE LEVEL: Primary | ICD-10-CM

## 2025-05-22 DIAGNOSIS — M79.10 MYALGIA: ICD-10-CM

## 2025-05-22 DIAGNOSIS — R53.83 OTHER FATIGUE: ICD-10-CM

## 2025-05-22 PROBLEM — F33.1 MODERATE EPISODE OF RECURRENT MAJOR DEPRESSIVE DISORDER (HCC): Status: ACTIVE | Noted: 2025-05-22

## 2025-05-22 PROCEDURE — 99213 OFFICE O/P EST LOW 20 MIN: CPT

## 2025-05-22 RX ORDER — ESCITALOPRAM OXALATE 20 MG/1
20 TABLET ORAL DAILY
Qty: 90 TABLET | Refills: 3 | Status: SHIPPED | OUTPATIENT
Start: 2025-05-22

## 2025-05-22 RX ORDER — TRAZODONE HYDROCHLORIDE 50 MG/1
50 TABLET ORAL NIGHTLY
Qty: 90 TABLET | Refills: 3 | Status: SHIPPED | OUTPATIENT
Start: 2025-05-22

## 2025-05-22 ASSESSMENT — PATIENT HEALTH QUESTIONNAIRE - PHQ9
1. LITTLE INTEREST OR PLEASURE IN DOING THINGS: MORE THAN HALF THE DAYS
SUM OF ALL RESPONSES TO PHQ QUESTIONS 1-9: 13
SUM OF ALL RESPONSES TO PHQ QUESTIONS 1-9: 13
5. POOR APPETITE OR OVEREATING: NEARLY EVERY DAY
7. TROUBLE CONCENTRATING ON THINGS, SUCH AS READING THE NEWSPAPER OR WATCHING TELEVISION: SEVERAL DAYS
4. FEELING TIRED OR HAVING LITTLE ENERGY: NEARLY EVERY DAY
2. FEELING DOWN, DEPRESSED OR HOPELESS: SEVERAL DAYS
SUM OF ALL RESPONSES TO PHQ QUESTIONS 1-9: 13
3. TROUBLE FALLING OR STAYING ASLEEP: SEVERAL DAYS
10. IF YOU CHECKED OFF ANY PROBLEMS, HOW DIFFICULT HAVE THESE PROBLEMS MADE IT FOR YOU TO DO YOUR WORK, TAKE CARE OF THINGS AT HOME, OR GET ALONG WITH OTHER PEOPLE: NOT DIFFICULT AT ALL
9. THOUGHTS THAT YOU WOULD BE BETTER OFF DEAD, OR OF HURTING YOURSELF: NOT AT ALL
8. MOVING OR SPEAKING SO SLOWLY THAT OTHER PEOPLE COULD HAVE NOTICED. OR THE OPPOSITE, BEING SO FIGETY OR RESTLESS THAT YOU HAVE BEEN MOVING AROUND A LOT MORE THAN USUAL: NOT AT ALL
6. FEELING BAD ABOUT YOURSELF - OR THAT YOU ARE A FAILURE OR HAVE LET YOURSELF OR YOUR FAMILY DOWN: MORE THAN HALF THE DAYS
SUM OF ALL RESPONSES TO PHQ QUESTIONS 1-9: 13

## 2025-05-22 NOTE — PROGRESS NOTES
[facility]  [address] [place] [address]   Office [phone], Fax [phone]      Chief Complaint:     Chief Complaint   Patient presents with    Follow-up     For depression and leg pain-calf and knee pain no improvement/ left thigh pain-saw rheumatologist saw last month- currently running tests  Saw [facility] last week-seen for bone spurs        SUBJECTIVE  Hilda Oh is an 50 y.o. female who presents for depression follow up and chronic pain/diffuse myalgias    #Depression  - last seen on 2/26/25, at which time her lexapro was increased to 20mg  - reports improvement. Some intermittent bouts of depression, but overall, she is able to be more productive  - reporting about 85% improvement  - ongoing stress with the ongoing divorce  - is busy with part-time work and with her kids as well  - therapist - once monthly    #seasonal allergies  - takes xyzal and allegra intermittently for symptoms  - over the last few weeks, she has had increased itching in the evening hours  - denies redness, rash    #diffuse myalgias  - present to her lower extremities predominantly  - following with a rheumatologist    #fatigue  - she reports ongoing persistent fatigue  - this has not improved with a trial of discontinuation of her tizanidine      Allergies   Allergies   Allergen Reactions    Topiramate Swelling     Mouth lips swelling         Medications   Current Outpatient Medications   Medication Sig    traZODone (DESYREL) 50 MG tablet Take 1 tablet by mouth nightly    escitalopram (LEXAPRO) 20 MG tablet Take 1 tablet by mouth daily    pantoprazole (PROTONIX) 40 MG tablet take 1 tablet by mouth 30 MINUTES before breakfast FOR REFLUX SYMPTOMS    ketorolac (TORADOL) 10 MG tablet Take 1 tablet by mouth every 6 hours as needed for Pain    NURTEC 75 MG TBDP Take 75 mg by mouth every other day    tiZANidine (ZANAFLEX) 2 MG tablet take  1 tablet three times a day (Patient taking

## 2025-05-22 NOTE — PROGRESS NOTES
Hilda Oh is a 50 y.o. female      Chief Complaint   Patient presents with    Follow-up     For depression and leg pain-calf and knee pain no improvement/ left thigh pain-saw rheumatologist saw last month- currently running tests  Saw [facility] last week-seen for bone spurs        \"Have you been to the ER, urgent care clinic since your last visit?  Hospitalized since your last visit?\"    NO    “Have you seen or consulted any other health care providers outside of [facility] since your last visit?”    NO        “Have you had a colorectal cancer screening such as a colonoscopy/FIT/Cologuard?    YES - Type: Colonoscopy - Where: 12/24 ele jones Nurse/CMA to request most recent records if not in the chart     No colonoscopy on file  No cologuard on file  No FIT/FOBT on file   No flexible sigmoidoscopy on file         Click Here for Release of Records Request    Vitals:    05/22/25 1335   BP: 99/68   BP Site: Right Upper Arm   Patient Position: Sitting   BP Cuff Size: Large Adult   Pulse: 88   Temp: 98.3 °F (36.8 °C)   TempSrc: Oral   SpO2: 98%   Weight: 94.3 kg (208 lb)   Height: 1.676 m (5' 5.98\")           Medication Reconciliation Completed, changes notes. Please Update medication list.

## 2025-05-23 ENCOUNTER — HOSPITAL ENCOUNTER (OUTPATIENT)
Facility: HOSPITAL | Age: 50
Setting detail: RECURRING SERIES
Discharge: HOME | End: 2025-05-26
Payer: COMMERCIAL

## 2025-05-23 LAB — GGT SERPL-CCNC: 20 U/L (ref 5–55)

## 2025-05-23 PROCEDURE — 97110 THERAPEUTIC EXERCISES: CPT

## 2025-05-23 PROCEDURE — 20560 NDL INSJ W/O NJX 1 OR 2 MUSC: CPT

## 2025-05-23 PROCEDURE — 97140 MANUAL THERAPY 1/> REGIONS: CPT

## 2025-05-23 NOTE — THERAPY RECERTIFICATION
[facility] [address], [address]  [place] [address]  Ph: [phone]     Fax: [phone]    CONTINUED PLAN OF CARE/RECERTIFICATION FOR PHYSICAL THERAPY          Patient Name:              Hilda Oh :  1975   Treatment/Medical Diagnosis:  Bilateral occipital neuralgia [M54.81]  Cervicogenic headache [G44.86]   Onset Date:  See evaluation    Referral Source:  Roddy Hunt Jr.,* Start of Care (SOC):  10/15/24   Prior Hospitalization:  See Medical History Provider #:  NPI      Prior Level of Function (PLOF):  independent   Comorbidities:  See intake   Medications:  Verified on Patient Summary List   Visits from SOC:  27 Missed Visits:  1     Progress toward Goals:  Short Term Goals: To be accomplished in 8 treatments.  Pt will report compliance to a progressive HEP to assist in rehab progression.              [x] Met [] Progressing [] Not Met  Date: 24 pt demonstrates knowledge of technique with exercises  Pt will demonstrate improved postural awareness in sitting and standing and will utilize techniques to correct posture.              [x] Met [] Progressing [] Not Met  Date: 24  Pt will report decrease in frequency and intensity of migraine headaches by 25%.              [x] Met [] Progressing [] Not Met  Date: 25  Long Term Goals: To be accomplished in 15 treatments.  Pt will report improved AMPAC score by 10% indicating improved function.              [] Met [x] Progressing [] Not Met  Date:  25% improved, pt reports impairments more limited due to RA in hands  Pt will report decrease in frequency of migraine headaches to average of 1x/week.              [x] Met [] Progressing [] Not Met  Date:   3.   Pt will report decrease in duration of migraine headache symptoms by 50%.              [x] Met [] Progressing [] Not Met  Date: 25    New Goals: To be accomplished in 6 treatments.  Pt will improve Neck Disability

## 2025-05-23 NOTE — PROGRESS NOTES
PHYSICAL THERAPY - DAILY TREATMENT NOTE (updated 3/23)      Date: 2025          Patient Name:  Hilda Oh :  1975   Medical   Diagnosis:  Bilateral occipital neuralgia [M54.81]  Cervicogenic headache [G44.86] Treatment Diagnosis:  M54.2, G89.29  CHRONIC NECK PAIN    Referral Source:  Roddy Hunt Jr.,* Insurance:   [facility] / [facility]: [facility] [place] / Product Type: *No Product type* /       Patient  verified yes     Visit #   Current  / Total 27 31   Time   In / Out 808am 909am   Total Treatment Time 61   Total Timed Codes 41   1:1 Treatment Time 41      [facility] Totals Reminder:  bill using total billable   min of TIMED therapeutic procedures and modalities.   8-22 min = 1 unit; 23-37 min = 2 units; 38-52 min = 3 units; 53-67 min = 4 units; 68-82 min = 5 units          SUBJECTIVE    Pain Level (0-10 scale): 3/10    Any medication changes, allergies to medications, adverse drug reactions, diagnosis change, or new procedure performed?: [x] No    [] Yes (see summary sheet for update)  Medications: Verified on Patient Summary List    Subjective functional status/changes:     Pt has not had to use her migraine medication at all over the past two weeks. Her neck has been doing better. Is trying to work more.    OBJECTIVE      Therapeutic Procedures:  Tx Min Billable or 1:1 Min (if diff from Tx Min) Procedure, Rationale, Specifics   25  99248 Therapeutic Exercise (timed):  increase ROM, strength, coordination, balance, and proprioception to improve patient's ability to progress to PLOF and address remaining functional goals. (see flow sheet as applicable)     Details if applicable:  progress report   16  21752 Manual Therapy (timed):  increase tissue extensibility and decrease trigger points to improve patient's ability to progress to PLOF and address remaining functional goals.  The manual therapy interventions were performed at a separate and distinct time from the therapeutic activities

## 2025-05-29 ENCOUNTER — RESULTS FOLLOW-UP (OUTPATIENT)
Age: 50
End: 2025-05-29

## 2025-06-02 ENCOUNTER — TRANSCRIBE ORDERS (OUTPATIENT)
Facility: HOSPITAL | Age: 50
End: 2025-06-02

## 2025-06-02 DIAGNOSIS — M75.101 NONTRAUMATIC TEAR OF RIGHT ROTATOR CUFF, UNSPECIFIED TEAR EXTENT: Primary | ICD-10-CM

## 2025-06-19 ENCOUNTER — HOSPITAL ENCOUNTER (OUTPATIENT)
Facility: HOSPITAL | Age: 50
Setting detail: RECURRING SERIES
Discharge: HOME | End: 2025-06-22
Payer: COMMERCIAL

## 2025-06-19 PROCEDURE — 97140 MANUAL THERAPY 1/> REGIONS: CPT

## 2025-06-19 PROCEDURE — 20561 NDL INSJ W/O NJX 3+ MUSC: CPT

## 2025-06-19 NOTE — PROGRESS NOTES
PHYSICAL THERAPY - DAILY TREATMENT NOTE (updated 3/23)      Date: 2025          Patient Name:  Hilda Oh :  1975   Medical   Diagnosis:  Bilateral occipital neuralgia [M54.81]  Cervicogenic headache [G44.86] Treatment Diagnosis:  M54.2, G89.29  CHRONIC NECK PAIN    Referral Source:  Roddy Hunt Jr.,* Insurance:   [facility]: [facility] [place] / Product Type: *No Product type* /       Patient  verified yes     Visit #   Current  / Total 28 31   Time   In / Out 900am 1000am   Total Treatment Time 60   Total Timed Codes 30   1:1 Treatment Time 30       [facility] Totals Reminder:  bill using total billable   min of TIMED therapeutic procedures and modalities.   8-22 min = 1 unit; 23-37 min = 2 units; 38-52 min = 3 units; 53-67 min = 4 units; 68-82 min = 5 units          SUBJECTIVE    Pain Level (0-10 scale): 3/10    Any medication changes, allergies to medications, adverse drug reactions, diagnosis change, or new procedure performed?: [x] No    [] Yes (see summary sheet for update)  Medications: Verified on Patient Summary List    Subjective functional status/changes:     Pt states she has been dealing with a lot of stress. Had a lot of migraines at the beginning of the month, which she relates to stress. Is feeling a bit better this week.  Is getting a shoulder MRI tomorrow to check for a tear. (Seeing Dr Solomon at [facility])      OBJECTIVE      Therapeutic Procedures:  Tx Min Billable or 1:1 Min (if diff from Tx Min) Procedure, Rationale, Specifics     11597 Therapeutic Exercise (timed):  increase ROM, strength, coordination, balance, and proprioception to improve patient's ability to progress to PLOF and address remaining functional goals. (see flow sheet as applicable)     Details if applicable:  progress report   30  95940 Manual Therapy (timed):  increase tissue extensibility and decrease trigger points to improve patient's ability to progress to PLOF and address remaining

## 2025-07-04 ENCOUNTER — PATIENT MESSAGE (OUTPATIENT)
Age: 50
End: 2025-07-04

## 2025-07-06 DIAGNOSIS — M54.81 BILATERAL OCCIPITAL NEURALGIA: ICD-10-CM

## 2025-07-06 DIAGNOSIS — G44.86 CERVICOGENIC HEADACHE: ICD-10-CM

## 2025-07-06 DIAGNOSIS — G43.709 CHRONIC MIGRAINE W/O AURA, NOT INTRACTABLE, W/O STAT MIGR: ICD-10-CM

## 2025-07-06 RX ORDER — RIMEGEPANT SULFATE 75 MG/75MG
75 TABLET, ORALLY DISINTEGRATING ORAL EVERY OTHER DAY
Qty: 16 TABLET | Refills: 5 | Status: SHIPPED | OUTPATIENT
Start: 2025-07-06

## 2025-07-06 RX ORDER — ELETRIPTAN HYDROBROMIDE 40 MG/1
TABLET, FILM COATED ORAL
Qty: 12 TABLET | Refills: 5 | Status: SHIPPED | OUTPATIENT
Start: 2025-07-06

## 2025-07-06 RX ORDER — KETOROLAC TROMETHAMINE 10 MG/1
10 TABLET, FILM COATED ORAL EVERY 6 HOURS PRN
Qty: 20 TABLET | Refills: 2 | Status: SHIPPED | OUTPATIENT
Start: 2025-07-06

## 2025-07-10 ENCOUNTER — COMMUNITY OUTREACH (OUTPATIENT)
Age: 50
End: 2025-07-10

## 2025-07-10 ENCOUNTER — HOSPITAL ENCOUNTER (OUTPATIENT)
Facility: HOSPITAL | Age: 50
Setting detail: RECURRING SERIES
Discharge: HOME | End: 2025-07-13
Payer: COMMERCIAL

## 2025-07-10 PROCEDURE — 97140 MANUAL THERAPY 1/> REGIONS: CPT

## 2025-07-10 PROCEDURE — 97110 THERAPEUTIC EXERCISES: CPT

## 2025-07-10 NOTE — PROGRESS NOTES
PHYSICAL THERAPY - DAILY TREATMENT NOTE (updated 3/23)      Date: 7/10/2025          Patient Name:  Hilda Oh :  1975   Medical   Diagnosis:  Bilateral occipital neuralgia [M54.81]  Cervicogenic headache [G44.86] Treatment Diagnosis:  M54.2, G89.29  CHRONIC NECK PAIN    Referral Source:  Roddy Hunt Jr.,* Insurance:   [facility] / [facility]: [facility] [place] / Product Type: *No Product type* /       Patient  verified yes     Visit #   Current  / Total 29 31   Time   In / Out 904am 958am   Total Treatment Time 51   Total Timed Codes 41   1:1 Treatment Time 41      [facility] Totals Reminder:  bill using total billable   min of TIMED therapeutic procedures and modalities.   8-22 min = 1 unit; 23-37 min = 2 units; 38-52 min = 3 units; 53-67 min = 4 units; 68-82 min = 5 units          SUBJECTIVE    Pain Level (0-10 scale): 3/10    Any medication changes, allergies to medications, adverse drug reactions, diagnosis change, or new procedure performed?: [x] No    [] Yes (see summary sheet for update)  Medications: Verified on Patient Summary List    Subjective functional status/changes:     Pt states her migraines have been better the past few weeks. 3-4 migraines in the last month.   Pt is under some stress because her  passed away last week.    OBJECTIVE      Therapeutic Procedures:  Tx Min Billable or 1:1 Min (if diff from Tx Min) Procedure, Rationale, Specifics   10  09557 Therapeutic Exercise (timed):  increase ROM, strength, coordination, balance, and proprioception to improve patient's ability to progress to PLOF and address remaining functional goals. (see flow sheet as applicable)     Details if applicable:  progress report   31  30728 Manual Therapy (timed):  increase tissue extensibility and decrease trigger points to improve patient's ability to progress to PLOF and address remaining functional goals.  The manual therapy interventions were performed at a separate and distinct time from

## 2025-07-10 NOTE — PROGRESS NOTES
[facility] [address], [address]  [place] [address]  Ph: [phone]     Fax: [phone]    THERAPY PROGRESS NOTE      Date of Initial Visit:  10/15/24 Attended Visits:  29 Missed Visits:  1         Patient Name:  Hilda Oh :  1975   Medical   Diagnosis:  Bilateral occipital neuralgia [M54.81]  Cervicogenic headache [G44.86] Treatment Diagnosis:  M54.2, G89.29  CHRONIC NECK PAIN    Referral Source:  Roddy Hunt Jr.,* [place]:   [place] / [facility] / Product Type: *No Product type* /       Other Objective/Functional Measures    Neck Disability Index 34% (improved from 40%)    CERVICAL SPINE ROM  Flexion 55  Extension 44  Rotation  R 56 / L 66  Sidebending R 32 / L 31    Pain Level at end of session (0-10 scale): 0/10       Assessment   Pt continues to report overall improvement in migraine frequency and reduction in cervical muscle tightness. Personal stress/other chronic health issues remain a barrier to progress. We have decreased frequency to 1 visit every 3 weeks with focus on manual therapy and dry needling, allowing the pt to perform HEP independently at home. Plan to continue for 2 further visits, then discharge to [facility].  Patient will continue to benefit from skilled PT / OT services to modify and progress therapeutic interventions, analyze and address soft tissue restrictions, analyze and cue for proper movement patterns, and analyze and modify for postural abnormalities to address functional deficits and attain remaining goals.    Progress toward goals / Updated goals:      Short Term Goals: To be accomplished in 8 treatments.  Pt will report compliance to a progressive HEP to assist in rehab progression.   [x] Met [] Progressing [] Not Met  Date: 24 pt demonstrates knowledge of technique with exercises  Pt will demonstrate improved postural awareness in sitting and standing and will utilize techniques to

## 2025-07-10 NOTE — PROGRESS NOTES
Patient's HM shows they are overdue for Colorectal Screening.   Care Everywhere and  files searched.  No results to attach to order nor HM updated.      Faxed request to Dr. Forrest Faith at [phone].

## 2025-07-15 DIAGNOSIS — M54.81 BILATERAL OCCIPITAL NEURALGIA: ICD-10-CM

## 2025-07-15 DIAGNOSIS — G44.86 CERVICOGENIC HEADACHE: ICD-10-CM

## 2025-07-15 RX ORDER — TIZANIDINE 2 MG/1
TABLET ORAL
Qty: 90 TABLET | Refills: 5 | Status: SHIPPED | OUTPATIENT
Start: 2025-07-15

## 2025-07-17 ENCOUNTER — TELEPHONE (OUTPATIENT)
Age: 50
End: 2025-07-17

## 2025-07-17 NOTE — TELEPHONE ENCOUNTER
----- Message from Kelly BRAND sent at 7/17/2025  8:32 AM EDT -----  Regarding: ECC Escalation To Practice  ECC Escalation To Practice      Type of Escalation: Acute Care Symptom  --------------------------------------------------------------------------------------------------------------------------    Information for Provider:  Patient is looking for appointment for: Symptom : Has fever, cough, congestion, occassional nausea, sore throat- since Saturday  Reasons for Message: Unable to reach practice     Additional Information Patient can barely talk.   --------------------------------------------------------------------------------------------------------------------------    Relationship to Patient: Self  Call Back Info: OK to leave message on voicemail or text message  Preferred Call Back Number: Phone [phone]   
DEP   8/14/2025  3:20 PM Gold Mariee MD [facility] BS AMB   9/25/2025  9:20 AM Roddy Hunt Jr., MD [facility] BS AMB   3/30/2026  8:30 AM Orly Almonte, APRN - NP [facility] BS AMB       Recommendations:   Appointment Scheduled  Notify office of any changes or concerns prior to appointment  If symptoms change or worsen, go to urgent care or nearest emergency room for evaluation      Patient agreed and voiced understanding to advice provided with opportunity for questions/concerns. No further questions or concerns at this time.     9:18 AM - Call ended/disconnected.    Pato Cordero RN

## 2025-07-18 ENCOUNTER — OFFICE VISIT (OUTPATIENT)
Age: 50
End: 2025-07-18

## 2025-07-18 VITALS
HEIGHT: 66 IN | DIASTOLIC BLOOD PRESSURE: 80 MMHG | WEIGHT: 213.41 LBS | RESPIRATION RATE: 18 BRPM | BODY MASS INDEX: 34.3 KG/M2 | SYSTOLIC BLOOD PRESSURE: 124 MMHG | HEART RATE: 86 BPM | TEMPERATURE: 99.1 F | OXYGEN SATURATION: 95 %

## 2025-07-18 DIAGNOSIS — B96.89 ACUTE BACTERIAL SINUSITIS: ICD-10-CM

## 2025-07-18 DIAGNOSIS — R05.1 ACUTE COUGH: Primary | ICD-10-CM

## 2025-07-18 DIAGNOSIS — J01.90 ACUTE BACTERIAL SINUSITIS: ICD-10-CM

## 2025-07-18 LAB
GROUP A STREP ANTIGEN, POC: POSITIVE
INFLUENZA A ANTIGEN, POC: NEGATIVE
INFLUENZA B ANTIGEN, POC: NEGATIVE
VALID INTERNAL CONTROL, POC: NORMAL
VALID INTERNAL CONTROL, POC: NORMAL

## 2025-07-18 RX ORDER — TIZANIDINE 2 MG/1
TABLET ORAL
Qty: 90 TABLET | Refills: 5 | Status: CANCELLED | OUTPATIENT
Start: 2025-07-18

## 2025-07-18 RX ORDER — METOPROLOL SUCCINATE 50 MG/1
50 TABLET, EXTENDED RELEASE ORAL DAILY
COMMUNITY

## 2025-07-18 RX ORDER — IPRATROPIUM BROMIDE AND ALBUTEROL SULFATE 2.5; .5 MG/3ML; MG/3ML
1 SOLUTION RESPIRATORY (INHALATION) EVERY 4 HOURS PRN
COMMUNITY

## 2025-07-18 RX ORDER — BUDESONIDE AND FORMOTEROL FUMARATE DIHYDRATE 160; 4.5 UG/1; UG/1
2 AEROSOL RESPIRATORY (INHALATION) 2 TIMES DAILY
COMMUNITY
Start: 2025-06-26

## 2025-07-18 RX ORDER — HYDROXYCHLOROQUINE SULFATE 200 MG/1
200 TABLET, FILM COATED ORAL 2 TIMES DAILY
COMMUNITY
Start: 2025-06-26

## 2025-07-18 RX ORDER — BENZONATATE 100 MG/1
100 CAPSULE ORAL 3 TIMES DAILY PRN
Qty: 30 CAPSULE | Refills: 0 | Status: SHIPPED | OUTPATIENT
Start: 2025-07-18 | End: 2025-07-28

## 2025-07-18 ASSESSMENT — PATIENT HEALTH QUESTIONNAIRE - PHQ9
5. POOR APPETITE OR OVEREATING: SEVERAL DAYS
6. FEELING BAD ABOUT YOURSELF - OR THAT YOU ARE A FAILURE OR HAVE LET YOURSELF OR YOUR FAMILY DOWN: SEVERAL DAYS
SUM OF ALL RESPONSES TO PHQ QUESTIONS 1-9: 11
2. FEELING DOWN, DEPRESSED OR HOPELESS: SEVERAL DAYS
SUM OF ALL RESPONSES TO PHQ QUESTIONS 1-9: 11
9. THOUGHTS THAT YOU WOULD BE BETTER OFF DEAD, OR OF HURTING YOURSELF: NOT AT ALL
1. LITTLE INTEREST OR PLEASURE IN DOING THINGS: SEVERAL DAYS
8. MOVING OR SPEAKING SO SLOWLY THAT OTHER PEOPLE COULD HAVE NOTICED. OR THE OPPOSITE, BEING SO FIGETY OR RESTLESS THAT YOU HAVE BEEN MOVING AROUND A LOT MORE THAN USUAL: NOT AT ALL
10. IF YOU CHECKED OFF ANY PROBLEMS, HOW DIFFICULT HAVE THESE PROBLEMS MADE IT FOR YOU TO DO YOUR WORK, TAKE CARE OF THINGS AT HOME, OR GET ALONG WITH OTHER PEOPLE: SOMEWHAT DIFFICULT
3. TROUBLE FALLING OR STAYING ASLEEP: NEARLY EVERY DAY
4. FEELING TIRED OR HAVING LITTLE ENERGY: NEARLY EVERY DAY
SUM OF ALL RESPONSES TO PHQ QUESTIONS 1-9: 11
SUM OF ALL RESPONSES TO PHQ QUESTIONS 1-9: 11
7. TROUBLE CONCENTRATING ON THINGS, SUCH AS READING THE NEWSPAPER OR WATCHING TELEVISION: SEVERAL DAYS

## 2025-07-18 NOTE — PROGRESS NOTES
Identified pt with two pt identifiers(name and ). Reviewed record in preparation for visit and have obtained necessary documentation.  Chief Complaint   Patient presents with    Sore Throat     X week    Cough    Fever        Health Maintenance Due   Topic    Hepatitis B vaccine (3 of 3 - Hep B Twinrix 3-dose series)       Vitals:    25 0805   BP: 124/80   BP Site: Right Upper Arm   Patient Position: Sitting   BP Cuff Size: Medium Adult   Pulse: 86   Resp: 18   Temp: 99.1 °F (37.3 °C)   TempSrc: Oral   SpO2: 95%   Weight: 96.8 kg (213 lb 6.5 oz)   Height: 1.676 m (5' 5.98\")         \"Have you been to the ER, urgent care clinic since your last visit?  Hospitalized since your last visit?\"    NO    “Have you seen or consulted any other health care providers outside of [facility] since your last visit?”    YES - When: approximately 3 months ago.  Where and Why: [facility] and possible COPD.            Click Here for Release of Records Request     This patient is accompanied in the office by her self.  I have received verbal consent from Hilda Oh to discuss any/all medical information while they are present in the room.

## 2025-07-18 NOTE — PATIENT INSTRUCTIONS
Stay home from work, school, or  until:  You’ve been on antibiotics for at least 24 hours  AND your fever is gone     Take All Medications  Take your antibiotics exactly as prescribed--even if you feel better before you finish them.  This helps prevent complications and keeps you from spreading the infection.    ?Prevent Spreading Germs  Cover your mouth when you cough or sneeze (use a tissue or your elbow).  Wash your hands often with soap and water, especially after coughing or sneezing.  Do not share drinks, utensils, toothbrushes, or towels.  Avoid close contact (like kissing or hugging) until you’re no longer contagious.    Keep Things Clean  Wash your hands regularly.  Clean surfaces that you touch often--like doorknobs, phones, and remotes.  Change and wash your pillowcases, sheets, and towels.    When to Call Your Doctor  Call your doctor if:  Your symptoms don’t improve within 48 hours of starting antibiotics  You have trouble swallowing or breathing  You develop a rash, ear pain, joint pain, or new symptoms

## 2025-07-18 NOTE — PROGRESS NOTES
[facility] Medicine Residency    Subjective   Hilda Oh is a 50 y.o. female who presents for Sore Throat (X week), Cough, and Fever    Cough and congestion  -Symptoms started on Friday 7/11.  Started with congestion.  On 7/12 patient began to have a sore throat, cough, aches and chills.  She also had a fever that continued until late Monday early Tuesday.  -Took leftover antibiotics (ciprofloxacin?) for her worsening cough. Thought it was sinusitis  -Fever from 7/12-14 with the highest temp of 101.8 with tylenol  -no fever for 24 hours  -fever of 101.8 after tylenol on 7/16  -no fevers since then  -has a residual dry cough that is sometimes productive with left sided rib pain when coughing  -gets headaches with coughing  -coughing keeping patient up at night  -Was recently diagnosed with COPD (2nd hand smoke) by her pulmonologist, Dr. Lopez. Does have SOB at times and feels like the cough and other symptoms are making her SOB worse. Having SOB at rest. Has symbicort and recently given albuterol which she has not picked up yet.    - has noticed a change in sputum quality but not quantity since getting sick  -Nausea helped with reglan. Eating and drinking appropriately. Has decreased appetite. Stooling and urinating appropriately.  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    Hypertension

## 2025-07-21 ENCOUNTER — PATIENT MESSAGE (OUTPATIENT)
Age: 50
End: 2025-07-21

## 2025-07-21 DIAGNOSIS — R05.1 ACUTE COUGH: Primary | ICD-10-CM

## 2025-07-21 NOTE — PROGRESS NOTES
Spoke with Dr. Bah about plain film imaging results on 7/18. Given \"ill defined opacity in the right mid zone\" we will obtain a repeat chest x ray around 8/1. Should opacity still be present can proceed with CT scan with or without contrast given patient toleration of contrast.     Will have front office schedule patient for a repeat chest x ray at our office.

## 2025-07-24 ENCOUNTER — TELEPHONE (OUTPATIENT)
Age: 50
End: 2025-07-24

## 2025-07-24 NOTE — TELEPHONE ENCOUNTER
Patient called to discuss imaging findings on chest xray. Mail box full. Will leave patient a mychart message.

## 2025-07-24 NOTE — TELEPHONE ENCOUNTER
Hi Dr. Elias,    Patient stated that he cough is still there and not getting better. She only has one more day of the antibiotics and wanted to know if you can send in something stronger. Patient stated that you can send her MyChart message as well.

## 2025-07-25 NOTE — TELEPHONE ENCOUNTER
Patient responding to Dr. Elias's question on how her symptoms are coming along. Patient reports that she does not feel like she is getting better: has a cough, sore throat, difficulty breathing, fatigue and finds it hard to talk. Does Dr. Elias want to try a different medicine?    [id number] [place] [id number] [facility] - P [phone] - F [phone]

## 2025-08-01 ENCOUNTER — OFFICE VISIT (OUTPATIENT)
Age: 50
End: 2025-08-01
Payer: COMMERCIAL

## 2025-08-01 ENCOUNTER — LAB (OUTPATIENT)
Age: 50
End: 2025-08-01

## 2025-08-01 VITALS
TEMPERATURE: 98.5 F | HEART RATE: 70 BPM | OXYGEN SATURATION: 98 % | SYSTOLIC BLOOD PRESSURE: 131 MMHG | RESPIRATION RATE: 18 BRPM | BODY MASS INDEX: 35.89 KG/M2 | WEIGHT: 223.33 LBS | HEIGHT: 66 IN | DIASTOLIC BLOOD PRESSURE: 88 MMHG

## 2025-08-01 DIAGNOSIS — J18.9 ATYPICAL PNEUMONIA: Primary | ICD-10-CM

## 2025-08-01 PROCEDURE — 99213 OFFICE O/P EST LOW 20 MIN: CPT

## 2025-08-01 RX ORDER — BENZONATATE 100 MG/1
100 CAPSULE ORAL 3 TIMES DAILY PRN
Qty: 30 CAPSULE | Refills: 0 | Status: SHIPPED | OUTPATIENT
Start: 2025-08-01 | End: 2025-08-15

## 2025-08-01 RX ORDER — GABAPENTIN 300 MG/1
300 CAPSULE ORAL DAILY
COMMUNITY
Start: 2025-07-22

## 2025-08-01 RX ORDER — DOXYCYCLINE HYCLATE 100 MG
100 TABLET ORAL 2 TIMES DAILY
Qty: 14 TABLET | Refills: 0 | Status: SHIPPED | OUTPATIENT
Start: 2025-08-01 | End: 2025-08-08

## 2025-08-01 RX ORDER — ALBUTEROL SULFATE 90 UG/1
2 INHALANT RESPIRATORY (INHALATION) EVERY 6 HOURS PRN
COMMUNITY
Start: 2025-07-18

## 2025-08-01 RX ORDER — GUAIFENESIN AND DEXTROMETHORPHAN HYDROBROMIDE 600; 30 MG/1; MG/1
1 TABLET, EXTENDED RELEASE ORAL EVERY 12 HOURS PRN
Qty: 28 TABLET | Refills: 0 | Status: SHIPPED | OUTPATIENT
Start: 2025-08-01 | End: 2025-08-15

## 2025-08-01 ASSESSMENT — PATIENT HEALTH QUESTIONNAIRE - PHQ9
SUM OF ALL RESPONSES TO PHQ QUESTIONS 1-9: 12
3. TROUBLE FALLING OR STAYING ASLEEP: NEARLY EVERY DAY
1. LITTLE INTEREST OR PLEASURE IN DOING THINGS: SEVERAL DAYS
7. TROUBLE CONCENTRATING ON THINGS, SUCH AS READING THE NEWSPAPER OR WATCHING TELEVISION: SEVERAL DAYS
4. FEELING TIRED OR HAVING LITTLE ENERGY: NEARLY EVERY DAY
8. MOVING OR SPEAKING SO SLOWLY THAT OTHER PEOPLE COULD HAVE NOTICED. OR THE OPPOSITE, BEING SO FIGETY OR RESTLESS THAT YOU HAVE BEEN MOVING AROUND A LOT MORE THAN USUAL: SEVERAL DAYS
10. IF YOU CHECKED OFF ANY PROBLEMS, HOW DIFFICULT HAVE THESE PROBLEMS MADE IT FOR YOU TO DO YOUR WORK, TAKE CARE OF THINGS AT HOME, OR GET ALONG WITH OTHER PEOPLE: SOMEWHAT DIFFICULT
5. POOR APPETITE OR OVEREATING: SEVERAL DAYS
6. FEELING BAD ABOUT YOURSELF - OR THAT YOU ARE A FAILURE OR HAVE LET YOURSELF OR YOUR FAMILY DOWN: SEVERAL DAYS
2. FEELING DOWN, DEPRESSED OR HOPELESS: SEVERAL DAYS
SUM OF ALL RESPONSES TO PHQ QUESTIONS 1-9: 12
9. THOUGHTS THAT YOU WOULD BE BETTER OFF DEAD, OR OF HURTING YOURSELF: NOT AT ALL
SUM OF ALL RESPONSES TO PHQ QUESTIONS 1-9: 12
SUM OF ALL RESPONSES TO PHQ QUESTIONS 1-9: 12

## 2025-08-01 NOTE — PROGRESS NOTES
Identified pt with two pt identifiers(name and ). Reviewed record in preparation for visit and have obtained necessary documentation.  Chief Complaint   Patient presents with    Hoarse     Remains with cough, left side rib pain area        Health Maintenance Due   Topic    Hepatitis B vaccine (3 of 3 - Hep B Twinrix 3-dose series)    Flu vaccine (1)       Vitals:    25 1041   BP: 131/88   BP Site: Right Upper Arm   Patient Position: Sitting   BP Cuff Size: Medium Adult   Pulse: 70   Resp: 18   Temp: 98.5 °F (36.9 °C)   TempSrc: Oral   SpO2: 98%   Weight: 101.3 kg (223 lb 5.2 oz)   Height: 1.676 m (5' 5.98\")         \"Have you been to the ER, urgent care clinic since your last visit?  Hospitalized since your last visit?\"    NO    “Have you seen or consulted any other health care providers outside of [facility] since your last visit?”    NO            Click Here for Release of Records Request     This patient is accompanied in the office by her self.  I have received verbal consent from Hilda Oh to discuss any/all medical information while they are present in the room.

## 2025-08-01 NOTE — PROGRESS NOTES
[facility] Medicine Res[facility]  [address] [place] [address]   Office [phone], Fax [phone]      Chief Complaint:     Chief Complaint   Patient presents with    Hoarse     Remains with cough, left side rib pain area       Subjective:   HPI:  Hilda Oh is a 50 y.o. female that presents to clinic for:    #Follow Up   - Reports she was diagnosed with PNA a few weeks ago   - Was treated with Augmentin alone   - States she continue with productive cough, now with right pleuritic pain  - Denies any further fevers   - Also prescribed tessalon pearls that have been helpful   - States her PCP, Dr. Elias, asked her to follow up for repeat CXR today due to \"concerning findings\" on previous CXR  - No other concerns or questions today    ROS:   Negative other than mentioned in HPI      Please note that this dictation was completed with [employer], the computer voice recognition software.  Quite often unanticipated grammatical, syntax, homophones, and other interpretive errors are inadvertently transcribed by the computer software.  Please disregard these errors.  Please excuse any errors that have escaped final proofreading.     Past medical history, social history, medications, and allergies personally reviewed.  Past Medical History:   Diagnosis Date    Anxiety     Has gotten worse with age and trauma    Arthritis     Chronic back pain     Lower back gotten worse last year    Depression     Just got pretty bad recently.    Hypertension     Suposedly from other medicine    Migraines     imitrex orally and injections    Nausea & vomiting      Social History     Socioeconomic History    Marital status: Legally      Spouse name: Not on file    Number of children: Not on file    Years of education: Not on file    Highest education level: Not on file   Occupational History    Not on file   Tobacco Use    Smoking status: Never    Smokeless tobacco: Never   Vaping Use    Vaping

## 2025-08-06 ENCOUNTER — TRANSCRIBE ORDERS (OUTPATIENT)
Facility: HOSPITAL | Age: 50
End: 2025-08-06

## 2025-08-06 DIAGNOSIS — M76.821 POSTERIOR TIBIAL TENDINITIS OF RIGHT LOWER EXTREMITY: ICD-10-CM

## 2025-08-06 DIAGNOSIS — M76.62 ACHILLES TENDINITIS, LEFT LEG: Primary | ICD-10-CM

## 2025-08-06 DIAGNOSIS — M92.62 HAGLUND'S DEFORMITY OF LEFT HEEL: ICD-10-CM

## 2025-08-07 ENCOUNTER — OFFICE VISIT (OUTPATIENT)
Age: 50
End: 2025-08-07
Payer: COMMERCIAL

## 2025-08-07 VITALS
SYSTOLIC BLOOD PRESSURE: 117 MMHG | HEIGHT: 65 IN | OXYGEN SATURATION: 95 % | WEIGHT: 216.27 LBS | TEMPERATURE: 98.5 F | BODY MASS INDEX: 36.03 KG/M2 | RESPIRATION RATE: 18 BRPM | HEART RATE: 93 BPM | DIASTOLIC BLOOD PRESSURE: 78 MMHG

## 2025-08-07 DIAGNOSIS — J18.9 ATYPICAL PNEUMONIA: Primary | ICD-10-CM

## 2025-08-07 PROCEDURE — 99213 OFFICE O/P EST LOW 20 MIN: CPT

## 2025-08-07 ASSESSMENT — PATIENT HEALTH QUESTIONNAIRE - PHQ9
8. MOVING OR SPEAKING SO SLOWLY THAT OTHER PEOPLE COULD HAVE NOTICED. OR THE OPPOSITE, BEING SO FIGETY OR RESTLESS THAT YOU HAVE BEEN MOVING AROUND A LOT MORE THAN USUAL: NEARLY EVERY DAY
SUM OF ALL RESPONSES TO PHQ QUESTIONS 1-9: 16
SUM OF ALL RESPONSES TO PHQ QUESTIONS 1-9: 16
3. TROUBLE FALLING OR STAYING ASLEEP: NEARLY EVERY DAY
6. FEELING BAD ABOUT YOURSELF - OR THAT YOU ARE A FAILURE OR HAVE LET YOURSELF OR YOUR FAMILY DOWN: SEVERAL DAYS
4. FEELING TIRED OR HAVING LITTLE ENERGY: NEARLY EVERY DAY
1. LITTLE INTEREST OR PLEASURE IN DOING THINGS: MORE THAN HALF THE DAYS
2. FEELING DOWN, DEPRESSED OR HOPELESS: MORE THAN HALF THE DAYS
10. IF YOU CHECKED OFF ANY PROBLEMS, HOW DIFFICULT HAVE THESE PROBLEMS MADE IT FOR YOU TO DO YOUR WORK, TAKE CARE OF THINGS AT HOME, OR GET ALONG WITH OTHER PEOPLE: SOMEWHAT DIFFICULT
5. POOR APPETITE OR OVEREATING: SEVERAL DAYS
7. TROUBLE CONCENTRATING ON THINGS, SUCH AS READING THE NEWSPAPER OR WATCHING TELEVISION: SEVERAL DAYS
9. THOUGHTS THAT YOU WOULD BE BETTER OFF DEAD, OR OF HURTING YOURSELF: NOT AT ALL
SUM OF ALL RESPONSES TO PHQ QUESTIONS 1-9: 16
SUM OF ALL RESPONSES TO PHQ QUESTIONS 1-9: 16

## 2025-08-07 ASSESSMENT — ENCOUNTER SYMPTOMS
DIARRHEA: 0
CHEST TIGHTNESS: 0
CONSTIPATION: 0
VOMITING: 0
COUGH: 1
NAUSEA: 0
SHORTNESS OF BREATH: 0
COLOR CHANGE: 0

## 2025-08-21 ENCOUNTER — HOSPITAL ENCOUNTER (OUTPATIENT)
Facility: HOSPITAL | Age: 50
Setting detail: RECURRING SERIES
Discharge: HOME | End: 2025-08-24
Payer: COMMERCIAL

## 2025-08-21 PROCEDURE — 97140 MANUAL THERAPY 1/> REGIONS: CPT

## 2025-08-21 PROCEDURE — 20561 NDL INSJ W/O NJX 3+ MUSC: CPT

## 2025-08-21 PROCEDURE — 97110 THERAPEUTIC EXERCISES: CPT

## 2025-08-22 ENCOUNTER — HOSPITAL ENCOUNTER (OUTPATIENT)
Facility: HOSPITAL | Age: 50
Discharge: HOME | End: 2025-08-25
Payer: COMMERCIAL

## 2025-08-22 DIAGNOSIS — M76.821 POSTERIOR TIBIAL TENDINITIS OF RIGHT LOWER EXTREMITY: ICD-10-CM

## 2025-08-22 DIAGNOSIS — M76.62 ACHILLES TENDINITIS, LEFT LEG: ICD-10-CM

## 2025-08-22 DIAGNOSIS — M92.62 HAGLUND'S DEFORMITY OF LEFT HEEL: ICD-10-CM

## 2025-08-22 PROCEDURE — 73718 MRI LOWER EXTREMITY W/O DYE: CPT

## 2025-08-25 ENCOUNTER — OFFICE VISIT (OUTPATIENT)
Age: 50
End: 2025-08-25
Payer: COMMERCIAL

## 2025-08-25 VITALS
HEART RATE: 70 BPM | BODY MASS INDEX: 35.96 KG/M2 | DIASTOLIC BLOOD PRESSURE: 68 MMHG | TEMPERATURE: 98.3 F | OXYGEN SATURATION: 97 % | WEIGHT: 215.8 LBS | RESPIRATION RATE: 18 BRPM | HEIGHT: 65 IN | SYSTOLIC BLOOD PRESSURE: 124 MMHG

## 2025-08-25 DIAGNOSIS — F33.41 RECURRENT MAJOR DEPRESSIVE DISORDER, IN PARTIAL REMISSION: ICD-10-CM

## 2025-08-25 DIAGNOSIS — F41.9 ANXIETY: Primary | ICD-10-CM

## 2025-08-25 PROCEDURE — 99213 OFFICE O/P EST LOW 20 MIN: CPT

## 2025-08-25 RX ORDER — TRAZODONE HYDROCHLORIDE 50 MG/1
50 TABLET ORAL NIGHTLY
Qty: 90 TABLET | Refills: 1 | Status: SHIPPED | OUTPATIENT
Start: 2025-08-25

## 2025-08-25 ASSESSMENT — PATIENT HEALTH QUESTIONNAIRE - PHQ9
10. IF YOU CHECKED OFF ANY PROBLEMS, HOW DIFFICULT HAVE THESE PROBLEMS MADE IT FOR YOU TO DO YOUR WORK, TAKE CARE OF THINGS AT HOME, OR GET ALONG WITH OTHER PEOPLE: SOMEWHAT DIFFICULT
SUM OF ALL RESPONSES TO PHQ QUESTIONS 1-9: 12
6. FEELING BAD ABOUT YOURSELF - OR THAT YOU ARE A FAILURE OR HAVE LET YOURSELF OR YOUR FAMILY DOWN: SEVERAL DAYS
4. FEELING TIRED OR HAVING LITTLE ENERGY: NEARLY EVERY DAY
2. FEELING DOWN, DEPRESSED OR HOPELESS: SEVERAL DAYS
9. THOUGHTS THAT YOU WOULD BE BETTER OFF DEAD, OR OF HURTING YOURSELF: NOT AT ALL
7. TROUBLE CONCENTRATING ON THINGS, SUCH AS READING THE NEWSPAPER OR WATCHING TELEVISION: SEVERAL DAYS
1. LITTLE INTEREST OR PLEASURE IN DOING THINGS: SEVERAL DAYS
SUM OF ALL RESPONSES TO PHQ QUESTIONS 1-9: 12
5. POOR APPETITE OR OVEREATING: SEVERAL DAYS
8. MOVING OR SPEAKING SO SLOWLY THAT OTHER PEOPLE COULD HAVE NOTICED. OR THE OPPOSITE, BEING SO FIGETY OR RESTLESS THAT YOU HAVE BEEN MOVING AROUND A LOT MORE THAN USUAL: SEVERAL DAYS
SUM OF ALL RESPONSES TO PHQ QUESTIONS 1-9: 12
3. TROUBLE FALLING OR STAYING ASLEEP: NEARLY EVERY DAY
SUM OF ALL RESPONSES TO PHQ QUESTIONS 1-9: 12

## 2025-09-03 ENCOUNTER — TELEPHONE (OUTPATIENT)
Age: 50
End: 2025-09-03

## 2025-09-03 DIAGNOSIS — F33.41 RECURRENT MAJOR DEPRESSIVE DISORDER, IN PARTIAL REMISSION: ICD-10-CM

## 2025-09-04 DIAGNOSIS — F41.9 ANXIETY: Primary | ICD-10-CM

## 2025-09-04 RX ORDER — BUSPIRONE HYDROCHLORIDE 5 MG/1
5 TABLET ORAL 2 TIMES DAILY
Qty: 60 TABLET | Refills: 0 | OUTPATIENT
Start: 2025-09-04 | End: 2025-10-04

## 2025-09-04 RX ORDER — BUSPIRONE HYDROCHLORIDE 5 MG/1
5 TABLET ORAL 2 TIMES DAILY
Qty: 60 TABLET | Refills: 0 | Status: SHIPPED | OUTPATIENT
Start: 2025-09-04 | End: 2025-10-04

## (undated) DEVICE — SPONGE GZ W4XL4IN COT 12 PLY TYP VII WVN C FLD DSGN

## (undated) DEVICE — 10K/24K ARTHROSCOPY INFLOW TUBE SET: Brand: 10K/24K

## (undated) DEVICE — DRAPE,EXTREMITY,89X128,STERILE: Brand: MEDLINE

## (undated) DEVICE — TUBING, SUCTION, 1/4" X 10', STRAIGHT: Brand: MEDLINE

## (undated) DEVICE — 4.5 MM INCISOR PLUS STRAIGHT                                    BLADES, POWER/EP-1, VIOLET, PACKAGED                                    6 PER BOX, STERILE

## (undated) DEVICE — 4-PORT MANIFOLD: Brand: NEPTUNE 2

## (undated) DEVICE — ARTHROSCOPY-SFMC: Brand: MEDLINE INDUSTRIES, INC.

## (undated) DEVICE — SOLUTION IRRIG 3000ML LAC RINGERS ARTHROMTC PLAS CONT

## (undated) DEVICE — PAD,ABDOMINAL,5"X9",ST,LF,25/BX: Brand: MEDLINE INDUSTRIES, INC.

## (undated) DEVICE — ZIMMER® STERILE DISPOSABLE TOURNIQUET CUFF WITH PROTECTIVE SLEEVE AND PLC, DUAL PORT, SINGLE BLADDER, 34 IN. (86 CM)

## (undated) DEVICE — GLOVE ORTHO 8   MSG9480